# Patient Record
Sex: MALE | Race: WHITE | NOT HISPANIC OR LATINO | URBAN - METROPOLITAN AREA
[De-identification: names, ages, dates, MRNs, and addresses within clinical notes are randomized per-mention and may not be internally consistent; named-entity substitution may affect disease eponyms.]

---

## 2016-01-08 RX ORDER — ATORVASTATIN CALCIUM 80 MG/1
0.5 TABLET, FILM COATED ORAL
Qty: 0 | Refills: 0 | COMMUNITY
Start: 2016-01-08

## 2016-01-08 RX ORDER — ATORVASTATIN CALCIUM 80 MG/1
1 TABLET, FILM COATED ORAL
Qty: 0 | Refills: 0 | COMMUNITY
Start: 2016-01-08

## 2017-08-09 ENCOUNTER — INPATIENT (INPATIENT)
Facility: HOSPITAL | Age: 82
LOS: 7 days | Discharge: HOME | DRG: 226 | End: 2017-08-17
Attending: INTERNAL MEDICINE | Admitting: INTERNAL MEDICINE
Payer: MEDICARE

## 2017-08-09 VITALS
RESPIRATION RATE: 16 BRPM | SYSTOLIC BLOOD PRESSURE: 127 MMHG | OXYGEN SATURATION: 100 % | DIASTOLIC BLOOD PRESSURE: 77 MMHG | HEART RATE: 83 BPM | TEMPERATURE: 98 F

## 2017-08-09 DIAGNOSIS — Z95.1 PRESENCE OF AORTOCORONARY BYPASS GRAFT: Chronic | ICD-10-CM

## 2017-08-09 DIAGNOSIS — R07.9 CHEST PAIN, UNSPECIFIED: ICD-10-CM

## 2017-08-09 DIAGNOSIS — E11.8 TYPE 2 DIABETES MELLITUS WITH UNSPECIFIED COMPLICATIONS: ICD-10-CM

## 2017-08-09 DIAGNOSIS — Z29.9 ENCOUNTER FOR PROPHYLACTIC MEASURES, UNSPECIFIED: ICD-10-CM

## 2017-08-09 DIAGNOSIS — R06.02 SHORTNESS OF BREATH: ICD-10-CM

## 2017-08-09 DIAGNOSIS — N18.3 CHRONIC KIDNEY DISEASE, STAGE 3 (MODERATE): ICD-10-CM

## 2017-08-09 DIAGNOSIS — I50.9 HEART FAILURE, UNSPECIFIED: ICD-10-CM

## 2017-08-09 LAB
CK MB CFR SERPL CALC: 3.3 NG/ML — SIGNIFICANT CHANGE UP (ref 0–6.7)
CK SERPL-CCNC: 80 U/L — SIGNIFICANT CHANGE UP (ref 30–200)
TROPONIN T SERPL-MCNC: <0.01 NG/ML — SIGNIFICANT CHANGE UP (ref 0–0.06)

## 2017-08-09 PROCEDURE — 99285 EMERGENCY DEPT VISIT HI MDM: CPT

## 2017-08-09 PROCEDURE — 99223 1ST HOSP IP/OBS HIGH 75: CPT

## 2017-08-09 PROCEDURE — 71010: CPT | Mod: 26

## 2017-08-09 RX ORDER — DEXTROSE 50 % IN WATER 50 %
12.5 SYRINGE (ML) INTRAVENOUS ONCE
Qty: 0 | Refills: 0 | Status: DISCONTINUED | OUTPATIENT
Start: 2017-08-09 | End: 2017-08-17

## 2017-08-09 RX ORDER — SODIUM CHLORIDE 9 MG/ML
1000 INJECTION, SOLUTION INTRAVENOUS
Qty: 0 | Refills: 0 | Status: DISCONTINUED | OUTPATIENT
Start: 2017-08-09 | End: 2017-08-17

## 2017-08-09 RX ORDER — INSULIN LISPRO 100/ML
VIAL (ML) SUBCUTANEOUS
Qty: 0 | Refills: 0 | Status: DISCONTINUED | OUTPATIENT
Start: 2017-08-09 | End: 2017-08-17

## 2017-08-09 RX ORDER — HEPARIN SODIUM 5000 [USP'U]/ML
5000 INJECTION INTRAVENOUS; SUBCUTANEOUS EVERY 8 HOURS
Qty: 0 | Refills: 0 | Status: DISCONTINUED | OUTPATIENT
Start: 2017-08-09 | End: 2017-08-17

## 2017-08-09 RX ORDER — INSULIN GLARGINE 100 [IU]/ML
6 INJECTION, SOLUTION SUBCUTANEOUS AT BEDTIME
Qty: 0 | Refills: 0 | Status: DISCONTINUED | OUTPATIENT
Start: 2017-08-09 | End: 2017-08-17

## 2017-08-09 RX ORDER — ASPIRIN/CALCIUM CARB/MAGNESIUM 324 MG
81 TABLET ORAL DAILY
Qty: 0 | Refills: 0 | Status: DISCONTINUED | OUTPATIENT
Start: 2017-08-09 | End: 2017-08-17

## 2017-08-09 RX ORDER — INSULIN LISPRO 100/ML
VIAL (ML) SUBCUTANEOUS AT BEDTIME
Qty: 0 | Refills: 0 | Status: DISCONTINUED | OUTPATIENT
Start: 2017-08-09 | End: 2017-08-17

## 2017-08-09 RX ORDER — GABAPENTIN 400 MG/1
100 CAPSULE ORAL
Qty: 0 | Refills: 0 | Status: DISCONTINUED | OUTPATIENT
Start: 2017-08-09 | End: 2017-08-17

## 2017-08-09 RX ORDER — CLOPIDOGREL BISULFATE 75 MG/1
75 TABLET, FILM COATED ORAL AT BEDTIME
Qty: 0 | Refills: 0 | Status: DISCONTINUED | OUTPATIENT
Start: 2017-08-09 | End: 2017-08-17

## 2017-08-09 RX ORDER — GABAPENTIN 400 MG/1
200 CAPSULE ORAL AT BEDTIME
Qty: 0 | Refills: 0 | Status: DISCONTINUED | OUTPATIENT
Start: 2017-08-09 | End: 2017-08-17

## 2017-08-09 RX ORDER — DEXTROSE 50 % IN WATER 50 %
25 SYRINGE (ML) INTRAVENOUS ONCE
Qty: 0 | Refills: 0 | Status: DISCONTINUED | OUTPATIENT
Start: 2017-08-09 | End: 2017-08-17

## 2017-08-09 RX ORDER — GLUCAGON INJECTION, SOLUTION 0.5 MG/.1ML
1 INJECTION, SOLUTION SUBCUTANEOUS ONCE
Qty: 0 | Refills: 0 | Status: DISCONTINUED | OUTPATIENT
Start: 2017-08-09 | End: 2017-08-17

## 2017-08-09 RX ORDER — DEXTROSE 50 % IN WATER 50 %
1 SYRINGE (ML) INTRAVENOUS ONCE
Qty: 0 | Refills: 0 | Status: DISCONTINUED | OUTPATIENT
Start: 2017-08-09 | End: 2017-08-17

## 2017-08-09 RX ORDER — FUROSEMIDE 40 MG
40 TABLET ORAL ONCE
Qty: 0 | Refills: 0 | Status: COMPLETED | OUTPATIENT
Start: 2017-08-09 | End: 2017-08-09

## 2017-08-09 RX ORDER — CARVEDILOL PHOSPHATE 80 MG/1
6.25 CAPSULE, EXTENDED RELEASE ORAL EVERY 12 HOURS
Qty: 0 | Refills: 0 | Status: DISCONTINUED | OUTPATIENT
Start: 2017-08-09 | End: 2017-08-10

## 2017-08-09 RX ORDER — INSULIN GLARGINE 100 [IU]/ML
6 INJECTION, SOLUTION SUBCUTANEOUS EVERY MORNING
Qty: 0 | Refills: 0 | Status: DISCONTINUED | OUTPATIENT
Start: 2017-08-10 | End: 2017-08-17

## 2017-08-09 RX ADMIN — Medication 40 MILLIGRAM(S): at 23:30

## 2017-08-09 RX ADMIN — CARVEDILOL PHOSPHATE 6.25 MILLIGRAM(S): 80 CAPSULE, EXTENDED RELEASE ORAL at 23:30

## 2017-08-09 RX ADMIN — CLOPIDOGREL BISULFATE 75 MILLIGRAM(S): 75 TABLET, FILM COATED ORAL at 23:30

## 2017-08-09 RX ADMIN — HEPARIN SODIUM 5000 UNIT(S): 5000 INJECTION INTRAVENOUS; SUBCUTANEOUS at 23:30

## 2017-08-09 NOTE — H&P ADULT - PROBLEM SELECTOR PLAN 1
Given hx of CAD and CABG concern for ACS. Patient does not endorse pleurisy to pain.  Monitor on tele  Trend CE  Check TTE  Primary day team to place cardiology consult. Given hx of CAD and CABG concern for ACS. Patient does not endorse pleurisy to pain.  Reviewed chart under MRN [id number]: Last stress test (nuclear 2015) noted to be abnormal study.  Monitor on tele  Trend CE  Check TTE  Primary day team to place cardiology consult and eval method of ischemic eval. Given hx of CAD and CABG concern for ACS. Patient does not endorse pleurisy to pain.  Reviewed chart under MRN [id number]: Last stress test (nuclear 2015) noted to be abnormal study.  C/W home regimen of Aspirin, Plavix, Statin  Monitor on tele  Trend CE  Check TTE  Primary day team to place cardiology consult and eval method of ischemic eval.

## 2017-08-09 NOTE — H&P ADULT - RS GEN PE MLT RESP DETAILS PC
good air movement/respirations non-labored/clear to auscultation bilaterally/no rhonchi/airway patent/no rales/breath sounds equal

## 2017-08-09 NOTE — H&P ADULT - NSHPLABSRESULTS_GEN_ALL_CORE
Personally reviewed labs: H/H stable, INR 1.16 K 4.7 Cr 1.47 Glucose 182     Personally reviewed CXR: small L pleural effusion    Personally reviewed EKG: SR 92 bpm  QTc 484 LAD no appreciable ST segment changes. Personally reviewed labs: H/H stable, INR 1.16 K 4.7 Cr 1.47 Glucose 182     Personally reviewed CXR: small L pleural effusion    Personally reviewed EKG: SR 92 bpm  QTc 484 LAD no appreciable ST segment changes. Reviewed chart under MRN [id number]: comparable to 12/28/ 2015 EKG

## 2017-08-09 NOTE — H&P ADULT - ASSESSMENT
60 yo man with PMH of HTN, HLD, DMT2, CAD s/p CABG (~12 years ago) presenting with symptoms of CP and SOB.

## 2017-08-09 NOTE — H&P ADULT - PMH
CAD (coronary artery disease)    DM type 2 (diabetes mellitus, type 2)    Essential hypertension    HLD (hyperlipidemia)

## 2017-08-09 NOTE — H&P ADULT - NSHPOUTPATIENTPROVIDERS_GEN_ALL_CORE
Dr. Reynolds ([facility]) Dr. Negron ([facility]  Dr. CIRILO Santos (Cardiology)

## 2017-08-09 NOTE — H&P ADULT - ATTENDING COMMENTS
Dr. Garth Ibrahim accepted patient's case from the ED team and requested hospitalist team to complete admission. Patient previously unknown to me and I was assigned case. Dr. Ibrahim/[facility] team to assume care in AM. Case discussed with night NP. Dr. Garth Ibrahim accepted patient's case from the ED team and requested hospitalist team to complete admission. Patient previously unknown to me and I was assigned case. Dr. Ibrahim/[facility] team to assume care in AM. Case discussed with night NP, Jenny, [id number]

## 2017-08-09 NOTE — H&P ADULT - HISTORY OF PRESENT ILLNESS
60 yo man with PMH of HTN, HLD, DMT2, CAD s/p CABG (~12 years ago) presenting with symptoms of CP and SOB the past few days. Patient states that last symptoms were 3 am where he felt shortness of breath and chest tightness. Patient denies radiation of symptoms. Patient states he felt a bit clammy/sweaty, but denies associated palpitations, nausea, emesis. He also feels that he had put on more weight and thinks that his LE has worsening edema.  Patient states that symptoms persisted for a few hours. Patient did not take any medication at home.  Patients states he has had similar episodes when ambulating for long distances. Patient denies recent stress tests in the past. 60 yo man with PMH of HTN, HLD, DMT2, CAD s/p CABG (~12 years ago) presenting with symptoms of CP and SOB the past few days. Patient states that last symptoms were 3 am where he felt shortness of breath and chest tightness. Patient denies radiation of symptoms. Patient states he felt a bit clammy/sweaty, but denies associated palpitations, nausea, emesis. He also feels that he had put on more weight and thinks that his LE has worsening edema.  Patient states that symptoms persisted for a few hours. Patient did not take any medication at home or do anything for alleviation. Per patient typically sleeps with 2 pillow and lays on his side. He does not think he need to sit up at night to feel more comfortable.  Patients states he has had similar episodes of chest tightness and shortness of breath when ambulating for long distances. Patient denies recent stress tests in the past. Of note patient with other MRN: [id number]  60 yo man with PMH of HTN, HLD, DMT2, CAD s/p CABG (~12 years ago) presenting with symptoms of CP and SOB the past few days. Patient states that last symptoms were 3 am where he felt shortness of breath and chest tightness. Patient denies radiation of symptoms. Patient states he felt a bit clammy/sweaty, but denies associated palpitations, nausea, emesis. He also feels that he had put on more weight and thinks that his LE has worsening edema.  Patient states that symptoms persisted for a few hours. Patient did not take any medication at home or do anything for alleviation. Per patient typically sleeps with 2 pillow and lays on his side. He does not think he need to sit up at night to feel more comfortable.  Patients states he has had similar episodes of chest tightness and shortness of breath when ambulating for long distances. Patient denies recent stress tests in the past. Per daughter, Jena, patient has been on Lasix in the past but has been discontinued.

## 2017-08-09 NOTE — H&P ADULT - PROBLEM SELECTOR PLAN 3
Hold oral medications  Continue with home dose of Lantus  Continue with SSI  Monitor FS  Check HgbA1c  Continue home dose of gabapentin for Neuropathy

## 2017-08-09 NOTE — H&P ADULT - NEUROLOGICAL DETAILS
sensation intact/responds to pain/responds to verbal commands/alert and oriented x 3/deep reflexes intact/cranial nerves intact

## 2017-08-09 NOTE — H&P ADULT - PROBLEM SELECTOR PLAN 2
Concern for decompensated heart failure as patient with increased LE edema, signs of pleural effusion on left and elevated BNP 5796 (prior BNP in 400s-500s). No hypoxia on exam. No persistent tachycardia on tele (SR 80s in ED).  Will try trial of IV lasix overnight  Primary day team to reeval diuresis regimen in AM  Monitor daily weights  Strict I/O  Check LE doppler for DVT Reviewed chart under MRN [id number]: Last EF TTE 2015 EF 37%  Concern for acute on chronic systolic heart failure as patient with increased LE edema, signs of pleural effusion on left and elevated BNP 5796 (prior BNP in 400s-500s). No hypoxia on exam. No persistent tachycardia on tele (SR 80s in ED).  Will try trial of IV lasix overnight  Primary day team to reeval diuresis regimen in AM  Monitor daily weights  Strict I/O  Check LE doppler for DVT

## 2017-08-10 DIAGNOSIS — I25.10 ATHEROSCLEROTIC HEART DISEASE OF NATIVE CORONARY ARTERY WITHOUT ANGINA PECTORIS: ICD-10-CM

## 2017-08-10 DIAGNOSIS — I50.23 ACUTE ON CHRONIC SYSTOLIC (CONGESTIVE) HEART FAILURE: ICD-10-CM

## 2017-08-10 DIAGNOSIS — I20.8 OTHER FORMS OF ANGINA PECTORIS: ICD-10-CM

## 2017-08-10 LAB
ANION GAP SERPL CALC-SCNC: 12 MMOL/L — SIGNIFICANT CHANGE UP (ref 5–17)
BUN SERPL-MCNC: 23 MG/DL — SIGNIFICANT CHANGE UP (ref 7–23)
CALCIUM SERPL-MCNC: 9.1 MG/DL — SIGNIFICANT CHANGE UP (ref 8.4–10.5)
CHLORIDE SERPL-SCNC: 105 MMOL/L — SIGNIFICANT CHANGE UP (ref 96–108)
CK MB BLD-MCNC: 3.5 % — SIGNIFICANT CHANGE UP (ref 0–3.5)
CK MB BLD-MCNC: 4.1 % — HIGH (ref 0–3.5)
CK MB CFR SERPL CALC: 2.7 NG/ML — SIGNIFICANT CHANGE UP (ref 0–6.7)
CK MB CFR SERPL CALC: 2.9 NG/ML — SIGNIFICANT CHANGE UP (ref 0–6.7)
CK MB CFR SERPL CALC: 3.1 NG/ML — SIGNIFICANT CHANGE UP (ref 0–6.7)
CK SERPL-CCNC: 71 U/L — SIGNIFICANT CHANGE UP (ref 30–200)
CK SERPL-CCNC: 78 U/L — SIGNIFICANT CHANGE UP (ref 30–200)
CK SERPL-CCNC: 86 U/L — SIGNIFICANT CHANGE UP (ref 30–200)
CO2 SERPL-SCNC: 24 MMOL/L — SIGNIFICANT CHANGE UP (ref 22–31)
CREAT SERPL-MCNC: 1.44 MG/DL — HIGH (ref 0.5–1.3)
GLUCOSE SERPL-MCNC: 128 MG/DL — HIGH (ref 70–99)
HBA1C BLD-MCNC: 7.9 % — HIGH (ref 4–5.6)
HCT VFR BLD CALC: 39.2 % — SIGNIFICANT CHANGE UP (ref 39–50)
HGB BLD-MCNC: 12.7 G/DL — LOW (ref 13–17)
MAGNESIUM SERPL-MCNC: 2 MG/DL — SIGNIFICANT CHANGE UP (ref 1.6–2.6)
MCHC RBC-ENTMCNC: 29.8 PG — SIGNIFICANT CHANGE UP (ref 27–34)
MCHC RBC-ENTMCNC: 32.5 GM/DL — SIGNIFICANT CHANGE UP (ref 32–36)
MCV RBC AUTO: 91.5 FL — SIGNIFICANT CHANGE UP (ref 80–100)
PHOSPHATE SERPL-MCNC: 2.8 MG/DL — SIGNIFICANT CHANGE UP (ref 2.5–4.5)
PLATELET # BLD AUTO: 219 K/UL — SIGNIFICANT CHANGE UP (ref 150–400)
POTASSIUM SERPL-MCNC: 4.8 MMOL/L — SIGNIFICANT CHANGE UP (ref 3.5–5.3)
POTASSIUM SERPL-SCNC: 4.8 MMOL/L — SIGNIFICANT CHANGE UP (ref 3.5–5.3)
RBC # BLD: 4.28 M/UL — SIGNIFICANT CHANGE UP (ref 4.2–5.8)
RBC # FLD: 14.6 % — HIGH (ref 10.3–14.5)
SODIUM SERPL-SCNC: 141 MMOL/L — SIGNIFICANT CHANGE UP (ref 135–145)
TROPONIN T SERPL-MCNC: <0.01 NG/ML — SIGNIFICANT CHANGE UP (ref 0–0.06)
WBC # BLD: 7.3 K/UL — SIGNIFICANT CHANGE UP (ref 3.8–10.5)
WBC # FLD AUTO: 7.3 K/UL — SIGNIFICANT CHANGE UP (ref 3.8–10.5)

## 2017-08-10 PROCEDURE — 93306 TTE W/DOPPLER COMPLETE: CPT | Mod: 26

## 2017-08-10 PROCEDURE — 93010 ELECTROCARDIOGRAM REPORT: CPT | Mod: 76

## 2017-08-10 RX ORDER — FUROSEMIDE 40 MG
20 TABLET ORAL DAILY
Qty: 0 | Refills: 0 | Status: DISCONTINUED | OUTPATIENT
Start: 2017-08-10 | End: 2017-08-14

## 2017-08-10 RX ORDER — ISOSORBIDE MONONITRATE 60 MG/1
30 TABLET, EXTENDED RELEASE ORAL DAILY
Qty: 0 | Refills: 0 | Status: DISCONTINUED | OUTPATIENT
Start: 2017-08-10 | End: 2017-08-11

## 2017-08-10 RX ADMIN — GABAPENTIN 200 MILLIGRAM(S): 400 CAPSULE ORAL at 22:39

## 2017-08-10 RX ADMIN — CARVEDILOL PHOSPHATE 6.25 MILLIGRAM(S): 80 CAPSULE, EXTENDED RELEASE ORAL at 09:12

## 2017-08-10 RX ADMIN — CARVEDILOL PHOSPHATE 6.25 MILLIGRAM(S): 80 CAPSULE, EXTENDED RELEASE ORAL at 18:08

## 2017-08-10 RX ADMIN — INSULIN GLARGINE 6 UNIT(S): 100 INJECTION, SOLUTION SUBCUTANEOUS at 22:39

## 2017-08-10 RX ADMIN — INSULIN GLARGINE 6 UNIT(S): 100 INJECTION, SOLUTION SUBCUTANEOUS at 01:32

## 2017-08-10 RX ADMIN — GABAPENTIN 100 MILLIGRAM(S): 400 CAPSULE ORAL at 15:35

## 2017-08-10 RX ADMIN — Medication 20 MILLIGRAM(S): at 12:15

## 2017-08-10 RX ADMIN — HEPARIN SODIUM 5000 UNIT(S): 5000 INJECTION INTRAVENOUS; SUBCUTANEOUS at 15:35

## 2017-08-10 RX ADMIN — HEPARIN SODIUM 5000 UNIT(S): 5000 INJECTION INTRAVENOUS; SUBCUTANEOUS at 05:59

## 2017-08-10 RX ADMIN — CLOPIDOGREL BISULFATE 75 MILLIGRAM(S): 75 TABLET, FILM COATED ORAL at 22:39

## 2017-08-10 RX ADMIN — Medication 81 MILLIGRAM(S): at 12:14

## 2017-08-10 RX ADMIN — INSULIN GLARGINE 6 UNIT(S): 100 INJECTION, SOLUTION SUBCUTANEOUS at 09:12

## 2017-08-10 RX ADMIN — GABAPENTIN 100 MILLIGRAM(S): 400 CAPSULE ORAL at 09:12

## 2017-08-10 RX ADMIN — ISOSORBIDE MONONITRATE 30 MILLIGRAM(S): 60 TABLET, EXTENDED RELEASE ORAL at 12:15

## 2017-08-10 RX ADMIN — HEPARIN SODIUM 5000 UNIT(S): 5000 INJECTION INTRAVENOUS; SUBCUTANEOUS at 22:39

## 2017-08-10 NOTE — CONSULT NOTE ADULT - PROBLEM SELECTOR RECOMMENDATION 2
-  - troponins have been negative.  - outpatient nuclear stress in april showed evidence of distal inferolateral and inferoapical injury without signficant ischemia.   - No plan for cardiac cath at this time.  - cont home dose of coreg.  - will add imdur 30 mg po daily.

## 2017-08-10 NOTE — CHART NOTE - NSCHARTNOTEFT_GEN_A_CORE
Notified of pause of 3.78 sec , seen and examined, asymptomatic  denied any CP , sob , Di, Noted a drop in BP , d/w DR Santos , recommends  Ep consult to [facility] and D/C Coreg,, c/w Tele ,  place R2 pads on Pt . Discussed the case with cardiology fellow DR Emeka Hernandez , who will evaluate the Pt , Pt signed out to night covering team     Arely Garibay  NP-C [id number] Notified of pause of 3.78 sec , seen and examined, asymptomatic  denied any CP , sob , Dizziness , palpitations, nausea ,vomiting, diaphoresis  or abdominal pain .  Noted a drop in BP ,  12 lead EKG with new changes in  Lead 2, and  ST elevation in  v3- v6 d/w DR Santos , recommends  Ep consult to House and D/C Coreg,, c/w Tele ,  CE  x1 ,  place R2 pads on Pt . Discussed the case with cardiology fellow DR Emeka Hernandez , who will evaluate the Pt , Pt signed out to night covering team     Arely Garibay  NP-C [id number]

## 2017-08-10 NOTE — PROGRESS NOTE ADULT - SUBJECTIVE AND OBJECTIVE BOX
Patient is a 89y old  Male who presents with a chief complaint of chest pain SOB (10 Aug 2017 01:47)      SUBJECTIVE / OVERNIGHT EVENTS:    pt with daughter and wife bedside.  no states sob continues not frequent trips to urinate no cp abd pain n/v/d     Vital Signs Last 24 Hrs  T(C): 36.4 (10 Aug 2017 12:15), Max: 36.6 (09 Aug 2017 20:00)  T(F): 15 (10 Aug 2017 12:30), Max: 97.8 (09 Aug 2017 20:00)  HR: 85 (10 Aug 2017 12:15) (79 - 92)  BP: 149/85 (10 Aug 2017 12:15) (119/72 - 166/88)  BP(mean): --  RR: 18 (10 Aug 2017 12:15) (16 - 18)  SpO2: 96% (10 Aug 2017 12:15) (96% - 100%)  I&O's Summary    09 Aug 2017 07:01  -  10 Aug 2017 07:00  --------------------------------------------------------  IN: 240 mL / OUT: 700 mL / NET: -460 mL    10 Aug 2017 07:01  -  10 Aug 2017 13:37  --------------------------------------------------------  IN: 300 mL / OUT: 0 mL / NET: 300 mL            LABS:                        12.7   7.3   )-----------( 219      ( 10 Aug 2017 06:46 )             39.2     08-10    141  |  105  |  23  ----------------------------<  128<H>  4.8   |  24  |  1.44<H>    Ca    9.1      10 Aug 2017 06:49  Phos  2.8     08-10  Mg     2.0     08-10    TPro  6.6  /  Alb  3.5  /  TBili  0.4  /  DBili  x   /  AST  15  /  ALT  13  /  AlkPhos  109  08-09    PT/INR - ( 09 Aug 2017 14:25 )   PT: 12.7 sec;   INR: 1.16 ratio         PTT - ( 09 Aug 2017 14:25 )  PTT:30.4 sec  CAPILLARY BLOOD GLUCOSE  120 (10 Aug 2017 12:55)  122 (10 Aug 2017 08:39)  135 (10 Aug 2017 01:27)        CARDIAC MARKERS ( 10 Aug 2017 06:49 )  x     / <0.01 ng/mL / 86 U/L / x     / 3.1 ng/mL  CARDIAC MARKERS ( 10 Aug 2017 00:02 )  x     / <0.01 ng/mL / 71 U/L / x     / 2.9 ng/mL  CARDIAC MARKERS ( 09 Aug 2017 17:44 )  x     / <0.01 ng/mL / 80 U/L / x     / 3.3 ng/mL  CARDIAC MARKERS ( 09 Aug 2017 14:25 )  x     / <0.01 ng/mL / x     / x     / x              RADIOLOGY & ADDITIONAL TESTS:    Imaging Personally Reviewed:  [x] YES  [ ] NO    Consultant(s) Notes Reviewed:  [x] YES  [ ] NO      MEDICATIONS  (STANDING):  heparin  Injectable 5000 Unit(s) SubCutaneous every 8 hours  aspirin enteric coated 81 milliGRAM(s) Oral daily  clopidogrel Tablet 75 milliGRAM(s) Oral at bedtime  gabapentin 200 milliGRAM(s) Oral at bedtime  gabapentin 100 milliGRAM(s) Oral <User Schedule>  carvedilol 6.25 milliGRAM(s) Oral every 12 hours  insulin glargine Injectable (LANTUS) 6 Unit(s) SubCutaneous every morning  insulin glargine Injectable (LANTUS) 6 Unit(s) SubCutaneous at bedtime  insulin lispro (HumaLOG) corrective regimen sliding scale   SubCutaneous three times a day before meals  insulin lispro (HumaLOG) corrective regimen sliding scale   SubCutaneous at bedtime  dextrose 5%. 1000 milliLiter(s) (50 mL/Hr) IV Continuous <Continuous>  dextrose 50% Injectable 12.5 Gram(s) IV Push once  dextrose 50% Injectable 25 Gram(s) IV Push once  dextrose 50% Injectable 25 Gram(s) IV Push once  furosemide   Injectable 20 milliGRAM(s) IV Push daily  isosorbide   mononitrate ER Tablet (IMDUR) 30 milliGRAM(s) Oral daily    MEDICATIONS  (PRN):  dextrose Gel 1 Dose(s) Oral once PRN Blood Glucose LESS THAN 70 milliGRAM(s)/deciliter  glucagon  Injectable 1 milliGRAM(s) IntraMuscular once PRN Glucose LESS THAN 70 milligrams/deciliter      Care Discussed with Consultants/Other Providers [x] YES  [ ] NO    HEALTH ISSUES - PROBLEM Dx:  Acute on chronic systolic (congestive) heart failure: Acute on chronic systolic (congestive) heart failure  Chronic stable angina: Chronic stable angina  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

## 2017-08-10 NOTE — CONSULT NOTE ADULT - ASSESSMENT
89 M h/o CAD s/p CABG in 2005 and PCI in 2009, ischemic cardiomyopathy (EF 29% on nuclear stress test 4/11/17), DM type II, HTN, carotid stenosis s/p R CEA a/w CP and acute on chronic systolic HF.

## 2017-08-10 NOTE — CONSULT NOTE ADULT - SUBJECTIVE AND OBJECTIVE BOX
[facility] Cardiology Consult  _________________________    CC: CP and dyspnea.       HPI:  89 M h/o CAD s/p CABG in 2005 and PCI in 2009, ischemic cardiomyopathy (EF 29% on nuclear stress test 4/11/17), DM type II, HTN, carotid stenosis s/p R CEA who presented to the hospital with worsening dyspnea and CP. He last saw me in the office in April and he had refused to increase his diuretics at that time. His symptoms started at 3 am - he felt shortness of breath and chest tightness with associated diaphoresis, but no palpitations, nausea, vomiting. He also reports recent increase in his weight and LE edema.      PAST MEDICAL & SURGICAL HISTORY:  CAD (coronary artery disease)  DM type 2 (diabetes mellitus, type 2)  HLD (hyperlipidemia)  Essential hypertension  S/P CABG (coronary artery bypass graft)    MEDICATIONS (HOME):  coreg 6.25 BID  Lipitor 40  plavix 75  ASA 81  Prandin  Onglyza  Primidone  Neurontin  Vesicar  gabapentin  NTG SL    MEDICATIONS  (STANDING):  heparin  Injectable 5000 Unit(s) SubCutaneous every 8 hours  aspirin enteric coated 81 milliGRAM(s) Oral daily  clopidogrel Tablet 75 milliGRAM(s) Oral at bedtime  gabapentin 200 milliGRAM(s) Oral at bedtime  gabapentin 100 milliGRAM(s) Oral <User Schedule>  carvedilol 6.25 milliGRAM(s) Oral every 12 hours  insulin glargine Injectable (LANTUS) 6 Unit(s) SubCutaneous every morning  insulin glargine Injectable (LANTUS) 6 Unit(s) SubCutaneous at bedtime  insulin lispro (HumaLOG) corrective regimen sliding scale   SubCutaneous three times a day before meals  insulin lispro (HumaLOG) corrective regimen sliding scale   SubCutaneous at bedtime  dextrose 5%. 1000 milliLiter(s) (50 mL/Hr) IV Continuous <Continuous>  dextrose 50% Injectable 12.5 Gram(s) IV Push once  dextrose 50% Injectable 25 Gram(s) IV Push once  dextrose 50% Injectable 25 Gram(s) IV Push once    MEDICATIONS  (PRN):  dextrose Gel 1 Dose(s) Oral once PRN Blood Glucose LESS THAN 70 milliGRAM(s)/deciliter  glucagon  Injectable 1 milliGRAM(s) IntraMuscular once PRN Glucose LESS THAN 70 milligrams/deciliter      Allergies    No Known Allergies    Intolerances        Social Histroy: Tobacco- , ETOH-, Illicit Drugs-    T(C): 36.5 (08-10-17 @ 05:43), Max: 36.6 (08-09-17 @ 20:00)  HR: 79 (08-10-17 @ 05:43) (79 - 92)  BP: 119/72 (08-10-17 @ 05:43) (119/72 - 166/88)  RR: 18 (08-10-17 @ 05:43) (16 - 18)  SpO2: 97% (08-10-17 @ 05:43) (97% - 100%)  I&O's Summary    09 Aug 2017 07:01  -  10 Aug 2017 07:00  --------------------------------------------------------  IN: 240 mL / OUT: 700 mL / NET: -460 mL        Review of Systems:  Constitutional: [ ] Fever [ ] Chills [ ] Fatigue [ ] Weight change   HEENT: [ ] Blurred vision [ ] Eye Pain [ ] Headache [ ] Runny nose [ ] Sore Throat   Respiratory: [ ] Cough [ ] Wheezing [ ] Shortness of breath  Cardiovascular: [x ] Chest Pain [ ] Palpitations [x ] BARCENAS [ ] PND [ ] Orthopnea [x] LE edema  Gastrointestinal: [ ] Abdominal Pain [ ] Diarrhea [ ] Constipation [ ] Hemorrhoids [ ] Nausea [ ] Vomiting  Genitourinary: [ ] Nocturia [ ] Dysuria [ ] Incontinence  Extremities: [ ] Swelling [ ] Joint Pain  Neurologic: [ ] Focal deficit [ ] Paresthesias [ ] Syncope  Lymphatic: [ ] Swelling [ ] Lymphadenopathy   Skin: [ ] Rash [ ] Ecchymoses [ ] Wounds [ ] Lesions  Psychiatry: [ ] Depression [ ] Suicidal/Homicidal Ideation [ ] Anxiety [ ] Sleep Disturbances  [x ] 10 point review of systems is otherwise negative except as mentioned above            [ ]Unable to obtain    PHYSICAL EXAM:  GENERAL: Alert, NAD  NECK: Supple, No JVD, No carotid bruit.  CHEST/LUNG: Clear to auscultation bilaterally; No wheezes, rales, or rhonchi  HEART: S1 S2 normal, RRR,  No murmurs, rubs, or gallops  ABDOMEN: Soft, Nontender, Nondistended; Bowel sounds present  EXTREMITIES: 2+ Edema.      LABS:                        12.7   7.3   )-----------( 219      ( 10 Aug 2017 06:46 )             39.2     08-10    141  |  105  |  23  ----------------------------<  128<H>  4.8   |  24  |  1.44<H>    Ca    9.1      10 Aug 2017 06:49  Phos  2.8     08-10  Mg     2.0     08-10    TPro  6.6  /  Alb  3.5  /  TBili  0.4  /  DBili  x   /  AST  15  /  ALT  13  /  AlkPhos  109  08-09    PT/INR - ( 09 Aug 2017 14:25 )   PT: 12.7 sec;   INR: 1.16 ratio         PTT - ( 09 Aug 2017 14:25 )  PTT:30.4 sec  CARDIAC MARKERS ( 10 Aug 2017 06:49 )  x     / <0.01 ng/mL / 86 U/L / x     / 3.1 ng/mL  CARDIAC MARKERS ( 10 Aug 2017 00:02 )  x     / <0.01 ng/mL / 71 U/L / x     / 2.9 ng/mL  CARDIAC MARKERS ( 09 Aug 2017 17:44 )  x     / <0.01 ng/mL / 80 U/L / x     / 3.3 ng/mL  CARDIAC MARKERS ( 09 Aug 2017 14:25 )  x     / <0.01 ng/mL / x     / x     / x          Serum Pro-Brain Natriuretic Peptide: 5796 pg/mL (08-09-17 @ 14:25)          MEDICATIONS  (STANDING):  heparin  Injectable 5000 Unit(s) SubCutaneous every 8 hours  aspirin enteric coated 81 milliGRAM(s) Oral daily  clopidogrel Tablet 75 milliGRAM(s) Oral at bedtime  gabapentin 200 milliGRAM(s) Oral at bedtime  gabapentin 100 milliGRAM(s) Oral <User Schedule>  carvedilol 6.25 milliGRAM(s) Oral every 12 hours  insulin glargine Injectable (LANTUS) 6 Unit(s) SubCutaneous every morning  insulin glargine Injectable (LANTUS) 6 Unit(s) SubCutaneous at bedtime  insulin lispro (HumaLOG) corrective regimen sliding scale   SubCutaneous three times a day before meals  insulin lispro (HumaLOG) corrective regimen sliding scale   SubCutaneous at bedtime  dextrose 5%. 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 [facility] Cardiology Consult  _________________________    CC: CP and dyspnea.       HPI:  89 M h/o CAD s/p CABG in 2005 and PCI in 2009, ischemic cardiomyopathy (EF 29% on nuclear stress test 4/11/17), DM type II, HTN, carotid stenosis s/p R CEA who presented to the hospital with worsening dyspnea and CP. He last saw me in the office in April and he had refused to increase his diuretics at that time. His symptoms started at 3 am - he felt shortness of breath and chest tightness with associated diaphoresis, but no palpitations, nausea, vomiting. He also reports recent increase in his weight and LE edema.      PAST MEDICAL & SURGICAL HISTORY:  CAD (coronary artery disease)  DM type 2 (diabetes mellitus, type 2)  HLD (hyperlipidemia)  Essential hypertension  S/P CABG (coronary artery bypass graft)    MEDICATIONS (HOME):  coreg 6.25 BID  Lipitor 40  plavix 75  ASA 81  Prandin  Onglyza  Primidone  Neurontin  Vesicar  gabapentin  NTG SL    MEDICATIONS  (STANDING):  heparin  Injectable 5000 Unit(s) SubCutaneous every 8 hours  aspirin enteric coated 81 milliGRAM(s) Oral daily  clopidogrel Tablet 75 milliGRAM(s) Oral at bedtime  gabapentin 200 milliGRAM(s) Oral at bedtime  gabapentin 100 milliGRAM(s) Oral <User Schedule>  carvedilol 6.25 milliGRAM(s) Oral every 12 hours  insulin glargine Injectable (LANTUS) 6 Unit(s) SubCutaneous every morning  insulin glargine Injectable (LANTUS) 6 Unit(s) SubCutaneous at bedtime  insulin lispro (HumaLOG) corrective regimen sliding scale   SubCutaneous three times a day before meals  insulin lispro (HumaLOG) corrective regimen sliding scale   SubCutaneous at bedtime  dextrose 5%. 1000 milliLiter(s) (50 mL/Hr) IV Continuous <Continuous>  dextrose 50% Injectable 12.5 Gram(s) IV Push once  dextrose 50% Injectable 25 Gram(s) IV Push once  dextrose 50% Injectable 25 Gram(s) IV Push once    MEDICATIONS  (PRN):  dextrose Gel 1 Dose(s) Oral once PRN Blood Glucose LESS THAN 70 milliGRAM(s)/deciliter  glucagon  Injectable 1 milliGRAM(s) IntraMuscular once PRN Glucose LESS THAN 70 milligrams/deciliter      Allergies    No Known Allergies    Intolerances        Social Histroy: Tobacco- , ETOH-, Illicit Drugs-    T(C): 36.5 (08-10-17 @ 05:43), Max: 36.6 (08-09-17 @ 20:00)  HR: 79 (08-10-17 @ 05:43) (79 - 92)  BP: 119/72 (08-10-17 @ 05:43) (119/72 - 166/88)  RR: 18 (08-10-17 @ 05:43) (16 - 18)  SpO2: 97% (08-10-17 @ 05:43) (97% - 100%)  I&O's Summary    09 Aug 2017 07:01  -  10 Aug 2017 07:00  --------------------------------------------------------  IN: 240 mL / OUT: 700 mL / NET: -460 mL        Review of Systems:  Constitutional: [ ] Fever [ ] Chills [ ] Fatigue [ ] Weight change   HEENT: [ ] Blurred vision [ ] Eye Pain [ ] Headache [ ] Runny nose [ ] Sore Throat   Respiratory: [ ] Cough [ ] Wheezing [ ] Shortness of breath  Cardiovascular: [x ] Chest Pain [ ] Palpitations [x ] BARCENAS [ ] PND [ ] Orthopnea [x] LE edema  Gastrointestinal: [ ] Abdominal Pain [ ] Diarrhea [ ] Constipation [ ] Hemorrhoids [ ] Nausea [ ] Vomiting  Genitourinary: [ ] Nocturia [ ] Dysuria [ ] Incontinence  Extremities: [ ] Swelling [ ] Joint Pain  Neurologic: [ ] Focal deficit [ ] Paresthesias [ ] Syncope  Lymphatic: [ ] Swelling [ ] Lymphadenopathy   Skin: [ ] Rash [ ] Ecchymoses [ ] Wounds [ ] Lesions  Psychiatry: [ ] Depression [ ] Suicidal/Homicidal Ideation [ ] Anxiety [ ] Sleep Disturbances  [x ] 10 point review of systems is otherwise negative except as mentioned above            [ ]Unable to obtain    PHYSICAL EXAM:  GENERAL: Alert, NAD  NECK: Supple, No JVD, No carotid bruit.  CHEST/LUNG: Mild basilar crackles  HEART: S1 S2 normal, RRR,  No murmurs, rubs, or gallops  ABDOMEN: Soft, Nontender, Nondistended; Bowel sounds present  EXTREMITIES: 2+ Edema.      LABS:                        12.7   7.3   )-----------( 219      ( 10 Aug 2017 06:46 )             39.2     08-10    141  |  105  |  23  ----------------------------<  128<H>  4.8   |  24  |  1.44<H>    Ca    9.1      10 Aug 2017 06:49  Phos  2.8     08-10  Mg     2.0     08-10    TPro  6.6  /  Alb  3.5  /  TBili  0.4  /  DBili  x   /  AST  15  /  ALT  13  /  AlkPhos  109  08-09    PT/INR - ( 09 Aug 2017 14:25 )   PT: 12.7 sec;   INR: 1.16 ratio         PTT - ( 09 Aug 2017 14:25 )  PTT:30.4 sec  CARDIAC MARKERS ( 10 Aug 2017 06:49 )  x     / <0.01 ng/mL / 86 U/L / x     / 3.1 ng/mL  CARDIAC MARKERS ( 10 Aug 2017 00:02 )  x     / <0.01 ng/mL / 71 U/L / x     / 2.9 ng/mL  CARDIAC MARKERS ( 09 Aug 2017 17:44 )  x     / <0.01 ng/mL / 80 U/L / x     / 3.3 ng/mL  CARDIAC MARKERS ( 09 Aug 2017 14:25 )  x     / <0.01 ng/mL / x     / x     / x          Serum Pro-Brain Natriuretic Peptide: 5796 pg/mL (08-09-17 @ 14:25)          MEDICATIONS  (STANDING):  heparin  Injectable 5000 Unit(s) SubCutaneous every 8 hours  aspirin enteric coated 81 milliGRAM(s) Oral daily  clopidogrel Tablet 75 milliGRAM(s) Oral at bedtime  gabapentin 200 milliGRAM(s) Oral at bedtime  gabapentin 100 milliGRAM(s) Oral <User Schedule>  carvedilol 6.25 milliGRAM(s) Oral every 12 hours  insulin glargine Injectable (LANTUS) 6 Unit(s) SubCutaneous every morning  insulin glargine Injectable (LANTUS) 6 Unit(s) SubCutaneous at bedtime  insulin lispro (HumaLOG) corrective regimen sliding scale   SubCutaneous three times a day before meals  insulin lispro (HumaLOG) corrective regimen sliding scale   SubCutaneous at bedtime  dextrose 5%. 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

## 2017-08-10 NOTE — CONSULT NOTE ADULT - PROBLEM SELECTOR RECOMMENDATION 3
-  - Echo today preliminarily shows severely reduced LV systolic function, which is similar to the EF on outpatient nuclear stress test in April.  - follow-up official report.  - Lasix 20 mg IVP daily.  - monitor I/O and daily weights.   -   will follow.    A total of 80 minutes of face-to-face time was spent with the patient during this encounter -over half of that time was spent in counseling and coordination of care.    Jermaine Santos M.D., [facility]  [phone]

## 2017-08-11 DIAGNOSIS — I44.39 OTHER ATRIOVENTRICULAR BLOCK: ICD-10-CM

## 2017-08-11 LAB
ANION GAP SERPL CALC-SCNC: 10 MMOL/L — SIGNIFICANT CHANGE UP (ref 5–17)
BUN SERPL-MCNC: 27 MG/DL — HIGH (ref 7–23)
CALCIUM SERPL-MCNC: 9 MG/DL — SIGNIFICANT CHANGE UP (ref 8.4–10.5)
CHLORIDE SERPL-SCNC: 105 MMOL/L — SIGNIFICANT CHANGE UP (ref 96–108)
CO2 SERPL-SCNC: 26 MMOL/L — SIGNIFICANT CHANGE UP (ref 22–31)
CREAT SERPL-MCNC: 1.63 MG/DL — HIGH (ref 0.5–1.3)
GLUCOSE SERPL-MCNC: 90 MG/DL — SIGNIFICANT CHANGE UP (ref 70–99)
MAGNESIUM SERPL-MCNC: 2.1 MG/DL — SIGNIFICANT CHANGE UP (ref 1.6–2.6)
POTASSIUM SERPL-MCNC: 4.6 MMOL/L — SIGNIFICANT CHANGE UP (ref 3.5–5.3)
POTASSIUM SERPL-SCNC: 4.6 MMOL/L — SIGNIFICANT CHANGE UP (ref 3.5–5.3)
SODIUM SERPL-SCNC: 141 MMOL/L — SIGNIFICANT CHANGE UP (ref 135–145)

## 2017-08-11 PROCEDURE — 99223 1ST HOSP IP/OBS HIGH 75: CPT | Mod: GC

## 2017-08-11 RX ORDER — ISOSORBIDE MONONITRATE 60 MG/1
60 TABLET, EXTENDED RELEASE ORAL DAILY
Qty: 0 | Refills: 0 | Status: DISCONTINUED | OUTPATIENT
Start: 2017-08-11 | End: 2017-08-17

## 2017-08-11 RX ADMIN — Medication 81 MILLIGRAM(S): at 09:42

## 2017-08-11 RX ADMIN — HEPARIN SODIUM 5000 UNIT(S): 5000 INJECTION INTRAVENOUS; SUBCUTANEOUS at 06:05

## 2017-08-11 RX ADMIN — ISOSORBIDE MONONITRATE 60 MILLIGRAM(S): 60 TABLET, EXTENDED RELEASE ORAL at 13:42

## 2017-08-11 RX ADMIN — INSULIN GLARGINE 6 UNIT(S): 100 INJECTION, SOLUTION SUBCUTANEOUS at 09:41

## 2017-08-11 RX ADMIN — Medication 20 MILLIGRAM(S): at 06:05

## 2017-08-11 RX ADMIN — CLOPIDOGREL BISULFATE 75 MILLIGRAM(S): 75 TABLET, FILM COATED ORAL at 21:55

## 2017-08-11 RX ADMIN — HEPARIN SODIUM 5000 UNIT(S): 5000 INJECTION INTRAVENOUS; SUBCUTANEOUS at 13:42

## 2017-08-11 RX ADMIN — HEPARIN SODIUM 5000 UNIT(S): 5000 INJECTION INTRAVENOUS; SUBCUTANEOUS at 21:55

## 2017-08-11 RX ADMIN — INSULIN GLARGINE 6 UNIT(S): 100 INJECTION, SOLUTION SUBCUTANEOUS at 21:56

## 2017-08-11 RX ADMIN — GABAPENTIN 100 MILLIGRAM(S): 400 CAPSULE ORAL at 09:41

## 2017-08-11 RX ADMIN — GABAPENTIN 200 MILLIGRAM(S): 400 CAPSULE ORAL at 21:55

## 2017-08-11 RX ADMIN — GABAPENTIN 100 MILLIGRAM(S): 400 CAPSULE ORAL at 13:42

## 2017-08-11 NOTE — PROGRESS NOTE ADULT - SUBJECTIVE AND OBJECTIVE BOX
Patient is a 89y old  Male who presents with a chief complaint of chest pain SOB (10 Aug 2017 01:47)      SUBJECTIVE / OVERNIGHT EVENTS:    pt resting in bed state he feels better today sob improved no n/v/d fever chills     Vital Signs Last 24 Hrs  T(C): 36.5 (11 Aug 2017 12:39), Max: 37 (10 Aug 2017 21:02)  T(F): 97.7 (11 Aug 2017 12:39), Max: 98.6 (10 Aug 2017 21:02)  HR: 83 (11 Aug 2017 12:39) (69 - 83)  BP: 118/74 (11 Aug 2017 12:39) (109/66 - 132/77)  BP(mean): --  RR: 18 (11 Aug 2017 12:39) (18 - 18)  SpO2: 99% (11 Aug 2017 12:39) (94% - 99%)  I&O's Summary    10 Aug 2017 07:01  -  11 Aug 2017 07:00  --------------------------------------------------------  IN: 720 mL / OUT: 200 mL / NET: 520 mL            LABS:                        12.7   7.3   )-----------( 219      ( 10 Aug 2017 06:46 )             39.2     08-11    141  |  105  |  27<H>  ----------------------------<  90  4.6   |  26  |  1.63<H>    Ca    9.0      11 Aug 2017 07:21  Phos  2.8     08-10  Mg     2.1     08-11    TPro  6.6  /  Alb  3.5  /  TBili  0.4  /  DBili  x   /  AST  15  /  ALT  13  /  AlkPhos  109  08-09    PT/INR - ( 09 Aug 2017 14:25 )   PT: 12.7 sec;   INR: 1.16 ratio         PTT - ( 09 Aug 2017 14:25 )  PTT:30.4 sec  CAPILLARY BLOOD GLUCOSE  138 (11 Aug 2017 13:05)  82 (11 Aug 2017 08:53)  183 (10 Aug 2017 21:33)  112 (10 Aug 2017 18:06)        CARDIAC MARKERS ( 10 Aug 2017 20:35 )  x     / <0.01 ng/mL / 78 U/L / x     / 2.7 ng/mL  CARDIAC MARKERS ( 10 Aug 2017 06:49 )  x     / <0.01 ng/mL / 86 U/L / x     / 3.1 ng/mL  CARDIAC MARKERS ( 10 Aug 2017 00:02 )  x     / <0.01 ng/mL / 71 U/L / x     / 2.9 ng/mL  CARDIAC MARKERS ( 09 Aug 2017 17:44 )  x     / <0.01 ng/mL / 80 U/L / x     / 3.3 ng/mL  CARDIAC MARKERS ( 09 Aug 2017 14:25 )  x     / <0.01 ng/mL / x     / x     / x              RADIOLOGY & ADDITIONAL TESTS:    Imaging Personally Reviewed:  [x] YES  [ ] NO    Consultant(s) Notes Reviewed:  [x] YES  [ ] NO      MEDICATIONS  (STANDING):  heparin  Injectable 5000 Unit(s) SubCutaneous every 8 hours  aspirin enteric coated 81 milliGRAM(s) Oral daily  clopidogrel Tablet 75 milliGRAM(s) Oral at bedtime  gabapentin 200 milliGRAM(s) Oral at bedtime  gabapentin 100 milliGRAM(s) Oral <User Schedule>  insulin glargine Injectable (LANTUS) 6 Unit(s) SubCutaneous every morning  insulin glargine Injectable (LANTUS) 6 Unit(s) SubCutaneous at bedtime  insulin lispro (HumaLOG) corrective regimen sliding scale   SubCutaneous three times a day before meals  insulin lispro (HumaLOG) corrective regimen sliding scale   SubCutaneous at bedtime  dextrose 5%. 1000 milliLiter(s) (50 mL/Hr) IV Continuous <Continuous>  dextrose 50% Injectable 12.5 Gram(s) IV Push once  dextrose 50% Injectable 25 Gram(s) IV Push once  dextrose 50% Injectable 25 Gram(s) IV Push once  furosemide   Injectable 20 milliGRAM(s) IV Push daily  isosorbide   mononitrate ER Tablet (IMDUR) 60 milliGRAM(s) Oral daily    MEDICATIONS  (PRN):  dextrose Gel 1 Dose(s) Oral once PRN Blood Glucose LESS THAN 70 milliGRAM(s)/deciliter  glucagon  Injectable 1 milliGRAM(s) IntraMuscular once PRN Glucose LESS THAN 70 milligrams/deciliter      Care Discussed with Consultants/Other Providers [x] YES  [ ] NO    HEALTH ISSUES - PROBLEM Dx:  High degree atrioventricular block: High degree atrioventricular block  Acute on chronic systolic (congestive) heart failure: Acute on chronic systolic (congestive) heart failure  Chronic stable angina: Chronic stable angina  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

## 2017-08-11 NOTE — CONSULT NOTE ADULT - SUBJECTIVE AND OBJECTIVE BOX
Mr. Asencio is a 89 yo gentleman w/ hx of DM, CAD s/p CABG (2005), ICMO (EF 15-20% as per 2d Echo 8/2017) who was initially admitted for SOB/orthopnea/LE edema.  Pt seen/evaluated by primary cardiologist during this admission.  On 8/10/2017, "3.78 sec pause noted on telemetry".  Pt denied CP/dizziness, or any syncopal events.  Rest of resp sx's improved.  General cardiologist requested EP service to evaluate pt for this.            PMH:   CAD (coronary artery disease)  DM type 2 (diabetes mellitus, type 2)  HLD (hyperlipidemia)  Essential hypertension      PSH:   S/P CABG (coronary artery bypass graft)      Medications:   heparin  Injectable 5000 Unit(s) SubCutaneous every 8 hours  aspirin enteric coated 81 milliGRAM(s) Oral daily  clopidogrel Tablet 75 milliGRAM(s) Oral at bedtime  gabapentin 200 milliGRAM(s) Oral at bedtime  gabapentin 100 milliGRAM(s) Oral <User Schedule>  insulin glargine Injectable (LANTUS) 6 Unit(s) SubCutaneous every morning  insulin glargine Injectable (LANTUS) 6 Unit(s) SubCutaneous at bedtime  insulin lispro (HumaLOG) corrective regimen sliding scale   SubCutaneous three times a day before meals  insulin lispro (HumaLOG) corrective regimen sliding scale   SubCutaneous at bedtime  dextrose 5%. 1000 milliLiter(s) IV Continuous <Continuous>  dextrose Gel 1 Dose(s) Oral once PRN  dextrose 50% Injectable 12.5 Gram(s) IV Push once  dextrose 50% Injectable 25 Gram(s) IV Push once  dextrose 50% Injectable 25 Gram(s) IV Push once  glucagon  Injectable 1 milliGRAM(s) IntraMuscular once PRN  furosemide   Injectable 20 milliGRAM(s) IV Push daily  isosorbide   mononitrate ER Tablet (IMDUR) 30 milliGRAM(s) Oral daily      Allergies:  No Known Allergies      FAMILY HISTORY:  Family history of heart disease (Father)      Social History:  Smoking History:  Alcohol Use:  Drug Use:    Review of Systems:  REVIEW OF SYSTEMS:    CONSTITUTIONAL: No weakness, fevers or chills  EYES/ENT: No visual changes;  No dysphagia  NECK: No pain or stiffness, no JVD  RESPIRATORY: No cough, wheezing, hemoptysis; No shortness of breath  CARDIOVASCULAR: no chest pain or palpitations; No lower extremity edema  GASTROINTESTINAL: No abdominal or epigastric pain. No nausea, vomiting, or hematemesis; No diarrhea or constipation. No melena or hematochezia.  BACK: No back pain  GENITOURINARY: No dysuria, frequency or hematuria  NEUROLOGICAL: No numbness or weakness  SKIN: No itching, burning, rashes, or lesions   All other review of systems is negative unless indicated above.    Physical Exam:  T(F): 98.6 (08-10), Max: 98.6 (08-10)  HR: 79 (08-10) (75 - 85)  BP: 132/77 (08-10) (114/70 - 149/85)  RR: 18 (08-10)  SpO2: 99% (08-10)    GENERAL: No acute distress, well-developed  HEAD:  Atraumatic, Normocephalic  ENT: EOMI, PERRLA, conjunctiva and sclera clear, Neck supple, No JVD, moist mucosa  CHEST/LUNG: Clear to auscultation bilaterally; No wheeze, equal breath sounds bilaterally   BACK: No spinal tenderness  HEART: Regular rate and rhythm; No murmurs, rubs, or gallops  ABDOMEN: Soft, Nontender, Nondistended; Bowel sounds present  EXTREMITIES:  bilateral pitting edema; improved   PSYCH: Nl behavior, nl affect  NEUROLOGY: AAOx3, non-focal, cranial nerves intact  SKIN: Normal color, No rashes or lesions  LINES:    Cardiovascular Diagnostic Testing:    ECG strip: NSR w/ three non-conducted p; seems like a transient AV flakita block:       Labs: Personally reviewed                        12.7   7.3   )-----------( 219      ( 10 Aug 2017 06:46 )             39.2     08-10    141  |  105  |  23  ----------------------------<  128<H>  4.8   |  24  |  1.44<H>    Ca    9.1      10 Aug 2017 06:49  Phos  2.8     08-10  Mg     2.0     08-10    TPro  6.6  /  Alb  3.5  /  TBili  0.4  /  DBili  x   /  AST  15  /  ALT  13  /  AlkPhos  109  08-09    PT/INR - ( 09 Aug 2017 14:25 )   PT: 12.7 sec;   INR: 1.16 ratio         PTT - ( 09 Aug 2017 14:25 )  PTT:30.4 sec  CARDIAC MARKERS ( 10 Aug 2017 20:35 )  x     / <0.01 ng/mL / 78 U/L / x     / 2.7 ng/mL  CARDIAC MARKERS ( 10 Aug 2017 06:49 )  x     / <0.01 ng/mL / 86 U/L / x     / 3.1 ng/mL  CARDIAC MARKERS ( 10 Aug 2017 00:02 )  x     / <0.01 ng/mL / 71 U/L / x     / 2.9 ng/mL  CARDIAC MARKERS ( 09 Aug 2017 17:44 )  x     / <0.01 ng/mL / 80 U/L / x     / 3.3 ng/mL  CARDIAC MARKERS ( 09 Aug 2017 14:25 )  x     / <0.01 ng/mL / x     / x     / x          Serum Pro-Brain Natriuretic Peptide: 5796 pg/mL (08-09 @ 14:25)      Hemoglobin A1C, Whole Blood: 7.9 % (08-10 @ 08:05)    Assessment  89 yo gentleman w/ hx of DM, CAD s/p CABG (2005), ICMO (EF 15-20% as per 2d Echo 8/2017) who was initially admitted for SOB/orthopnea/LE edema.  Now w/ strip showing three non-conducted p waves, transition AV flakita block.  However pt asymptomatic.      Plan  - recommend continuing to monitor electrolytes, K>4, Mg>2   - cont rest of medical therapy as per primary cardiologist   - will discuss in am rounds w/ EP service     Emeka Hernandez MD   [facility] PGY -4 cardiology fellow

## 2017-08-11 NOTE — CONSULT NOTE ADULT - PROBLEM SELECTOR RECOMMENDATION 9
Renal function at baseline on admission (as reviewed under patient's alternate MRN to be 1.3-1.6 in 12/2016 and 01/2017)  Possibly from longstanding history of DM/ diabetic nephropathy  check UA, urine protein/creatinine ratio  will check renal sono (nonemergent)  Dose meds for eGFR ~35-40  Would tolerate mild worsening of azotemia to optimize volume status  patient advised to avoid NSAID use in light of cardiac and renal history  phos at goal, no need for binders

## 2017-08-11 NOTE — CONSULT NOTE ADULT - SUBJECTIVE AND OBJECTIVE BOX
[facility]  [phone]  Dr. Pastor (covering for Dr. Brady)  NEPHROLOGY  INITIAL CONSULT NOTE  --------------------------------------------------------------------------------  HPI: 88 y/o male with below stated PMHx, as well as CHF EF 29% on stress test from April, 2017, CKD with baseline Creatinine 1.3-1.6, who presents with worsening SOB/LE edema x 3-4 days.  Patient reports alleviation of symptoms of SOB since in hospital as well as improving LE edema.  Renal evaluation requested in light of rising creatinine while being diuresed.  Patient denies knowledge of CKD and states he has never seen a nephrologist.  Patient has been found to have AV block with ventricular pauses on telemetry and is being evaluated for PPM +/-AICD.    Of note, patient reports NSAID use (Aleve) every other day for vague abdominal discomfort.        PAST HISTORY  --------------------------------------------------------------------------------  PAST MEDICAL & SURGICAL HISTORY:  CAD (coronary artery disease)  DM type 2 (diabetes mellitus, type 2)  HLD (hyperlipidemia)  Essential hypertension  S/P CABG (coronary artery bypass graft)    FAMILY HISTORY:  Family history of heart disease (Father)  Denies known history of kidney disease    PAST SOCIAL HISTORY:  Quit tobacco ~45 years ago  ETOH: denies  Illicit Drug: denies    ALLERGIES & MEDICATIONS  --------------------------------------------------------------------------------  Allergies    No Known Allergies    Intolerances      Standing Inpatient Medications  heparin  Injectable 5000 Unit(s) SubCutaneous every 8 hours  aspirin enteric coated 81 milliGRAM(s) Oral daily  clopidogrel Tablet 75 milliGRAM(s) Oral at bedtime  gabapentin 200 milliGRAM(s) Oral at bedtime  gabapentin 100 milliGRAM(s) Oral <User Schedule>  insulin glargine Injectable (LANTUS) 6 Unit(s) SubCutaneous every morning  insulin glargine Injectable (LANTUS) 6 Unit(s) SubCutaneous at bedtime  insulin lispro (HumaLOG) corrective regimen sliding scale   SubCutaneous three times a day before meals  insulin lispro (HumaLOG) corrective regimen sliding scale   SubCutaneous at bedtime  dextrose 5%. 1000 milliLiter(s) IV Continuous <Continuous>  dextrose 50% Injectable 12.5 Gram(s) IV Push once  dextrose 50% Injectable 25 Gram(s) IV Push once  dextrose 50% Injectable 25 Gram(s) IV Push once  furosemide   Injectable 20 milliGRAM(s) IV Push daily  isosorbide   mononitrate ER Tablet (IMDUR) 60 milliGRAM(s) Oral daily    PRN Inpatient Medications  dextrose Gel 1 Dose(s) Oral once PRN  glucagon  Injectable 1 milliGRAM(s) IntraMuscular once PRN      REVIEW OF SYSTEMS  --------------------------------------------------------------------------------  General: + fatigue, + weight gain  CVS:  + chest pressure PTA, denies currently. Denies palpitations. +orthopnea  Respiratory:  + BARCENAS denies cough/wheeze  GI:  Denies abdominal pain/N/V/D  : Denies hematuria/dysuria/oliguria  Neuro: Denies dizziness/vertigo    All other systems were reviewed and are negative, except as noted.    VITALS/PHYSICAL EXAM  --------------------------------------------------------------------------------  T(C): 36.5 (08-11-17 @ 12:39), Max: 37 (08-10-17 @ 21:02)  HR: 83 (08-11-17 @ 12:39) (69 - 83)  BP: 118/74 (08-11-17 @ 12:39) (109/66 - 132/77)  RR: 18 (08-11-17 @ 12:39) (18 - 18)  SpO2: 99% (08-11-17 @ 12:39) (94% - 99%)  Wt(kg): --  Height (cm): 180.34 (08-09-17 @ 22:35)  Weight (kg): 110.3 (08-09-17 @ 22:35)  BMI (kg/m2): 33.9 (08-09-17 @ 22:35)  BSA (m2): 2.29 (08-09-17 @ 22:35)      08-10-17 @ 07:01  -  08-11-17 @ 07:00  --------------------------------------------------------  IN: 720 mL / OUT: 200 mL / NET: 520 mL      Physical Exam:  	Gen: NAD, well-appearing  	Pulm: CTA B/L no w/r/r  	CV: RRR, S1S2  	Abd: +BS, soft, nontender/nondistended  	: No suprapubic tenderness, no campbell  	Extremity: B/L LE pitting edema to upper shins  	Neuro: A&Ox3  	  LABS/STUDIES  --------------------------------------------------------------------------------              12.7   7.3   >-----------<  219      [08-10-17 @ 06:46]              39.2     141  |  105  |  27  ----------------------------<  90      [08-11-17 @ 07:21]  4.6   |  26  |  1.63        Ca     9.0     [08-11-17 @ 07:21]      Mg     2.1     [08-11-17 @ 07:21]      Phos  2.8     [08-10-17 @ 06:49]    TPro  6.6  /  Alb  3.5  /  TBili  0.4  /  DBili  x   /  AST  15  /  ALT  13  /  AlkPhos  109  [08-09-17 @ 17:44]        Troponin <0.01      [08-10-17 @ 20:35]  CK 78      [08-10-17 @ 20:35]    eGFR if : 43 mL/min/1.73M2 (08-11 @ 07:21)    eGFR if Non African American: 37 mL/min/1.73M2 (08-11 @ 07:21)    Creatinine Trend:  SCr 1.63 [08-11 @ 07:21]  SCr 1.44 [08-10 @ 06:49]  SCr 1.47 [08-09 @ 17:44]  SCr 1.48 [08-09 @ 14:25]        HbA1c 7.9      [08-10-17 @ 08:05]

## 2017-08-11 NOTE — PROGRESS NOTE ADULT - SUBJECTIVE AND OBJECTIVE BOX
[facility] Cardiology Progress Note  _______________________________    Pt. seen and examined. No new cardiac-related complaints.    Telemetry - sinus 70-80. Overnight an episode of high degree AV block with ventricular pause of 3.8 seconds.    T(C): 36.5 (08-11-17 @ 04:15), Max: 37 (08-10-17 @ 21:02)  HR: 73 (08-11-17 @ 06:04) (69 - 85)  BP: 124/74 (08-11-17 @ 06:04) (109/66 - 149/85)  RR: 18 (08-11-17 @ 04:15) (18 - 18)  SpO2: 94% (08-11-17 @ 04:15) (94% - 99%)  I&O's Summary    10 Aug 2017 07:01  -  11 Aug 2017 07:00  --------------------------------------------------------  IN: 720 mL / OUT: 200 mL / NET: 520 mL        PHYSICAL EXAM:  GENERAL: Alert, NAD  NECK: Supple, No JVD, No carotid bruit.  CHEST/LUNG: Mild basilar crackles  HEART: S1 S2 normal, RRR,  No murmurs, rubs, or gallops  ABDOMEN: Soft, Nontender, Nondistended; Bowel sounds present  EXTREMITIES: 1+ Edema.      LABS:                        12.7   7.3   )-----------( 219      ( 10 Aug 2017 06:46 )             39.2     08-11    141  |  105  |  27<H>  ----------------------------<  90  4.6   |  26  |  1.63<H>    Ca    9.0      11 Aug 2017 07:21  Phos  2.8     08-10  Mg     2.0     08-10    TPro  6.6  /  Alb  3.5  /  TBili  0.4  /  DBili  x   /  AST  15  /  ALT  13  /  AlkPhos  109  08-09    PT/INR - ( 09 Aug 2017 14:25 )   PT: 12.7 sec;   INR: 1.16 ratio         PTT - ( 09 Aug 2017 14:25 )  PTT:30.4 sec  CARDIAC MARKERS ( 10 Aug 2017 20:35 )  x     / <0.01 ng/mL / 78 U/L / x     / 2.7 ng/mL  CARDIAC MARKERS ( 10 Aug 2017 06:49 )  x     / <0.01 ng/mL / 86 U/L / x     / 3.1 ng/mL  CARDIAC MARKERS ( 10 Aug 2017 00:02 )  x     / <0.01 ng/mL / 71 U/L / x     / 2.9 ng/mL  CARDIAC MARKERS ( 09 Aug 2017 17:44 )  x     / <0.01 ng/mL / 80 U/L / x     / 3.3 ng/mL  CARDIAC MARKERS ( 09 Aug 2017 14:25 )  x     / <0.01 ng/mL / x     / x     / x          MEDICATIONS  (STANDING):  heparin  Injectable 5000 Unit(s) SubCutaneous every 8 hours  aspirin enteric coated 81 milliGRAM(s) Oral daily  clopidogrel Tablet 75 milliGRAM(s) Oral at bedtime  gabapentin 200 milliGRAM(s) Oral at bedtime  gabapentin 100 milliGRAM(s) Oral <User Schedule>  insulin glargine Injectable (LANTUS) 6 Unit(s) SubCutaneous every morning  insulin glargine Injectable (LANTUS) 6 Unit(s) SubCutaneous at bedtime  insulin lispro (HumaLOG) corrective regimen sliding scale   SubCutaneous three times a day before meals  insulin lispro (HumaLOG) corrective regimen sliding scale   SubCutaneous at bedtime  dextrose 5%. 1000 milliLiter(s) (50 mL/Hr) IV Continuous <Continuous>  dextrose 50% Injectable 12.5 Gram(s) IV Push once  dextrose 50% Injectable 25 Gram(s) IV Push once  dextrose 50% Injectable 25 Gram(s) IV Push once  furosemide   Injectable 20 milliGRAM(s) IV Push daily  isosorbide   mononitrate ER Tablet (IMDUR) 30 milliGRAM(s) Oral daily    MEDICATIONS  (PRN):  dextrose Gel 1 Dose(s) Oral once PRN Blood Glucose LESS THAN 70 milliGRAM(s)/deciliter  glucagon  Injectable 1 milliGRAM(s) IntraMuscular once PRN Glucose LESS THAN 70 milligrams/deciliter      RADIOLOGY & ADDITIONAL TESTS:

## 2017-08-11 NOTE — PROGRESS NOTE ADULT - PROBLEM SELECTOR PLAN 4
-  - Echo shows severely reduced LV systolic function.  - EP attending follow-up re: whether he is a candidate for AICD.  - follow-up official report.  - monitor I/O and daily weights.    Jermaine Santos M.D., [facility]  [phone]

## 2017-08-11 NOTE — CONSULT NOTE ADULT - ASSESSMENT
88 y/o male with sCHF, CKD3, DM2 who presents with worsening SOB/orthopnea, LE edema and rising creatinine in setting of diuresis:

## 2017-08-12 DIAGNOSIS — R10.9 UNSPECIFIED ABDOMINAL PAIN: ICD-10-CM

## 2017-08-12 LAB
ANION GAP SERPL CALC-SCNC: 11 MMOL/L — SIGNIFICANT CHANGE UP (ref 5–17)
APPEARANCE UR: CLEAR — SIGNIFICANT CHANGE UP
BILIRUB UR-MCNC: NEGATIVE — SIGNIFICANT CHANGE UP
BUN SERPL-MCNC: 25 MG/DL — HIGH (ref 7–23)
CALCIUM SERPL-MCNC: 8.9 MG/DL — SIGNIFICANT CHANGE UP (ref 8.4–10.5)
CHLORIDE SERPL-SCNC: 105 MMOL/L — SIGNIFICANT CHANGE UP (ref 96–108)
CO2 SERPL-SCNC: 25 MMOL/L — SIGNIFICANT CHANGE UP (ref 22–31)
COLOR SPEC: COLORLESS — SIGNIFICANT CHANGE UP
CREAT ?TM UR-MCNC: 38 MG/DL — SIGNIFICANT CHANGE UP
CREAT SERPL-MCNC: 1.57 MG/DL — HIGH (ref 0.5–1.3)
DIFF PNL FLD: NEGATIVE — SIGNIFICANT CHANGE UP
GLUCOSE SERPL-MCNC: 111 MG/DL — HIGH (ref 70–99)
GLUCOSE UR QL: NEGATIVE — SIGNIFICANT CHANGE UP
KETONES UR-MCNC: NEGATIVE — SIGNIFICANT CHANGE UP
LEUKOCYTE ESTERASE UR-ACNC: NEGATIVE — SIGNIFICANT CHANGE UP
NITRITE UR-MCNC: NEGATIVE — SIGNIFICANT CHANGE UP
PH UR: 6.5 — SIGNIFICANT CHANGE UP (ref 5–8)
POTASSIUM SERPL-MCNC: 4.5 MMOL/L — SIGNIFICANT CHANGE UP (ref 3.5–5.3)
POTASSIUM SERPL-SCNC: 4.5 MMOL/L — SIGNIFICANT CHANGE UP (ref 3.5–5.3)
PROT ?TM UR-MCNC: <4 MG/DL — SIGNIFICANT CHANGE UP (ref 0–12)
PROT UR-MCNC: NEGATIVE — SIGNIFICANT CHANGE UP
PROT/CREAT UR-RTO: SIGNIFICANT CHANGE UP (ref 0–0.2)
SODIUM SERPL-SCNC: 141 MMOL/L — SIGNIFICANT CHANGE UP (ref 135–145)
SP GR SPEC: 1 — LOW (ref 1.01–1.02)
UROBILINOGEN FLD QL: NEGATIVE — SIGNIFICANT CHANGE UP
WBC UR QL: SIGNIFICANT CHANGE UP /HPF (ref 0–5)

## 2017-08-12 RX ADMIN — HEPARIN SODIUM 5000 UNIT(S): 5000 INJECTION INTRAVENOUS; SUBCUTANEOUS at 14:20

## 2017-08-12 RX ADMIN — HEPARIN SODIUM 5000 UNIT(S): 5000 INJECTION INTRAVENOUS; SUBCUTANEOUS at 22:53

## 2017-08-12 RX ADMIN — ISOSORBIDE MONONITRATE 60 MILLIGRAM(S): 60 TABLET, EXTENDED RELEASE ORAL at 12:33

## 2017-08-12 RX ADMIN — GABAPENTIN 100 MILLIGRAM(S): 400 CAPSULE ORAL at 14:20

## 2017-08-12 RX ADMIN — HEPARIN SODIUM 5000 UNIT(S): 5000 INJECTION INTRAVENOUS; SUBCUTANEOUS at 05:52

## 2017-08-12 RX ADMIN — GABAPENTIN 100 MILLIGRAM(S): 400 CAPSULE ORAL at 09:22

## 2017-08-12 RX ADMIN — CLOPIDOGREL BISULFATE 75 MILLIGRAM(S): 75 TABLET, FILM COATED ORAL at 22:53

## 2017-08-12 RX ADMIN — INSULIN GLARGINE 6 UNIT(S): 100 INJECTION, SOLUTION SUBCUTANEOUS at 23:23

## 2017-08-12 RX ADMIN — Medication 20 MILLIGRAM(S): at 05:52

## 2017-08-12 RX ADMIN — Medication 81 MILLIGRAM(S): at 12:33

## 2017-08-12 RX ADMIN — GABAPENTIN 200 MILLIGRAM(S): 400 CAPSULE ORAL at 22:53

## 2017-08-12 RX ADMIN — INSULIN GLARGINE 6 UNIT(S): 100 INJECTION, SOLUTION SUBCUTANEOUS at 09:22

## 2017-08-12 NOTE — PROGRESS NOTE ADULT - SUBJECTIVE AND OBJECTIVE BOX
[facility] Cardiology Progress Note  _______________________________    Pt. seen and examined. Breathing better. Legs still swelling.     Telemetry - sinus 70-90's    T(C): 37.1 (17 @ 04:24), Max: 37.1 (17 @ 04:24)  HR: 80 (17 @ 12:31) (74 - 81)  BP: 126/77 (17 @ 12:31) (126/77 - 144/79)  RR: 18 (17 @ 04:24) (18 - 18)  SpO2: 97% (17 @ 12:31) (96% - 99%)  I&O's Summary    11 Aug 2017 07:01  -  12 Aug 2017 07:00  --------------------------------------------------------  IN: 240 mL / OUT: 0 mL / NET: 240 mL        PHYSICAL EXAM:  GENERAL: Alert, NAD  NECK: Supple, No JVD, No carotid bruit.  CHEST/LUNG: cta b/l.  HEART: S1 S2 normal, RRR,  No murmurs, rubs, or gallops  ABDOMEN: Soft, Nontender, Nondistended; Bowel sounds present  EXTREMITIES: 1+ Edema.    LABS:        141  |  105  |  25<H>  ----------------------------<  111<H>  4.5   |  25  |  1.57<H>    Ca    8.9      12 Aug 2017 06:51  Mg     2.1     08-11        CARDIAC MARKERS ( 10 Aug 2017 20:35 )  x     / <0.01 ng/mL / 78 U/L / x     / 2.7 ng/mL  CARDIAC MARKERS ( 10 Aug 2017 06:49 )  x     / <0.01 ng/mL / 86 U/L / x     / 3.1 ng/mL  CARDIAC MARKERS ( 10 Aug 2017 00:02 )  x     / <0.01 ng/mL / 71 U/L / x     / 2.9 ng/mL  CARDIAC MARKERS ( 09 Aug 2017 17:44 )  x     / <0.01 ng/mL / 80 U/L / x     / 3.3 ng/mL  CARDIAC MARKERS ( 09 Aug 2017 14:25 )  x     / <0.01 ng/mL / x     / x     / x              Urinalysis Basic - ( 12 Aug 2017 07:04 )    Color: x / Appearance: Clear / S.005 / pH: x  Gluc: x / Ketone: Negative  / Bili: Negative / Urobili: Negative   Blood: x / Protein: Negative / Nitrite: Negative   Leuk Esterase: Negative / RBC: x / WBC 0-2 /HPF   Sq Epi: x / Non Sq Epi: x / Bacteria: x        MEDICATIONS  (STANDING):  heparin  Injectable 5000 Unit(s) SubCutaneous every 8 hours  aspirin enteric coated 81 milliGRAM(s) Oral daily  clopidogrel Tablet 75 milliGRAM(s) Oral at bedtime  gabapentin 200 milliGRAM(s) Oral at bedtime  gabapentin 100 milliGRAM(s) Oral <User Schedule>  insulin glargine Injectable (LANTUS) 6 Unit(s) SubCutaneous every morning  insulin glargine Injectable (LANTUS) 6 Unit(s) SubCutaneous at bedtime  insulin lispro (HumaLOG) corrective regimen sliding scale   SubCutaneous three times a day before meals  insulin lispro (HumaLOG) corrective regimen sliding scale   SubCutaneous at bedtime  dextrose 5%. 1000 milliLiter(s) (50 mL/Hr) IV Continuous <Continuous>  dextrose 50% Injectable 12.5 Gram(s) IV Push once  dextrose 50% Injectable 25 Gram(s) IV Push once  dextrose 50% Injectable 25 Gram(s) IV Push once  furosemide   Injectable 20 milliGRAM(s) IV Push daily  isosorbide   mononitrate ER Tablet (IMDUR) 60 milliGRAM(s) Oral daily    MEDICATIONS  (PRN):  dextrose Gel 1 Dose(s) Oral once PRN Blood Glucose LESS THAN 70 milliGRAM(s)/deciliter  glucagon  Injectable 1 milliGRAM(s) IntraMuscular once PRN Glucose LESS THAN 70 milligrams/deciliter      RADIOLOGY & ADDITIONAL TESTS:

## 2017-08-12 NOTE — PHYSICAL THERAPY INITIAL EVALUATION ADULT - PERTINENT HX OF CURRENT PROBLEM, REHAB EVAL
Pt is an 88 yo man with PMH of HTN, HLD, DMT2, CAD s/p CABG (~12 years ago) presenting with symptoms of CP and SOB the past few days. Patient states that last symptoms were 3 am where he felt shortness of breath and chest tightness. Patient denies radiation of symptoms. Patient states he felt a bit clammy/sweaty, but denies associated palpitations, nausea, emesis. He also feels that he had put on more weight and thinks that his LE has worsening edema.  Continued...

## 2017-08-12 NOTE — PROGRESS NOTE ADULT - SUBJECTIVE AND OBJECTIVE BOX
Patient is a 89y old  Male who presents with a chief complaint of chest pain SOB (10 Aug 2017 01:47)      SUBJECTIVE / OVERNIGHT EVENTS:    pt feeling well currently, pt notes sob earlier which has resolved.  no cp abd pain     Vital Signs Last 24 Hrs  T(C): 37.1 (12 Aug 2017 04:24), Max: 37.1 (12 Aug 2017 04:24)  T(F): 98.7 (12 Aug 2017 04:24), Max: 98.7 (12 Aug 2017 04:24)  HR: 80 (12 Aug 2017 12:31) (74 - 83)  BP: 126/77 (12 Aug 2017 12:31) (118/74 - 144/79)  BP(mean): --  RR: 18 (12 Aug 2017 04:24) (18 - 18)  SpO2: 97% (12 Aug 2017 12:31) (96% - 99%)  I&O's Summary    11 Aug 2017 07:01  -  12 Aug 2017 07:00  --------------------------------------------------------  IN: 240 mL / OUT: 0 mL / NET: 240 mL            LABS:        141  |  105  |  25<H>  ----------------------------<  111<H>  4.5   |  25  |  1.57<H>    Ca    8.9      12 Aug 2017 06:51  Mg     2.1             CAPILLARY BLOOD GLUCOSE  99 (12 Aug 2017 09:00)  139 (11 Aug 2017 21:22)  105 (11 Aug 2017 18:06)  138 (11 Aug 2017 13:05)        CARDIAC MARKERS ( 10 Aug 2017 20:35 )  x     / <0.01 ng/mL / 78 U/L / x     / 2.7 ng/mL      Urinalysis Basic - ( 12 Aug 2017 07:04 )    Color: x / Appearance: Clear / S.005 / pH: x  Gluc: x / Ketone: Negative  / Bili: Negative / Urobili: Negative   Blood: x / Protein: Negative / Nitrite: Negative   Leuk Esterase: Negative / RBC: x / WBC 0-2 /HPF   Sq Epi: x / Non Sq Epi: x / Bacteria: x        RADIOLOGY & ADDITIONAL TESTS:    Imaging Personally Reviewed:  [x] YES  [ ] NO    Consultant(s) Notes Reviewed:  [x] YES  [ ] NO      MEDICATIONS  (STANDING):  heparin  Injectable 5000 Unit(s) SubCutaneous every 8 hours  aspirin enteric coated 81 milliGRAM(s) Oral daily  clopidogrel Tablet 75 milliGRAM(s) Oral at bedtime  gabapentin 200 milliGRAM(s) Oral at bedtime  gabapentin 100 milliGRAM(s) Oral <User Schedule>  insulin glargine Injectable (LANTUS) 6 Unit(s) SubCutaneous every morning  insulin glargine Injectable (LANTUS) 6 Unit(s) SubCutaneous at bedtime  insulin lispro (HumaLOG) corrective regimen sliding scale   SubCutaneous three times a day before meals  insulin lispro (HumaLOG) corrective regimen sliding scale   SubCutaneous at bedtime  dextrose 5%. 1000 milliLiter(s) (50 mL/Hr) IV Continuous <Continuous>  dextrose 50% Injectable 12.5 Gram(s) IV Push once  dextrose 50% Injectable 25 Gram(s) IV Push once  dextrose 50% Injectable 25 Gram(s) IV Push once  furosemide   Injectable 20 milliGRAM(s) IV Push daily  isosorbide   mononitrate ER Tablet (IMDUR) 60 milliGRAM(s) Oral daily    MEDICATIONS  (PRN):  dextrose Gel 1 Dose(s) Oral once PRN Blood Glucose LESS THAN 70 milliGRAM(s)/deciliter  glucagon  Injectable 1 milliGRAM(s) IntraMuscular once PRN Glucose LESS THAN 70 milligrams/deciliter      Care Discussed with Consultants/Other Providers [x] YES  [ ] NO    HEALTH ISSUES - PROBLEM Dx:  High degree atrioventricular block: High degree atrioventricular block  Acute on chronic systolic (congestive) heart failure: Acute on chronic systolic (congestive) heart failure  Chronic stable angina: Chronic stable angina  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

## 2017-08-12 NOTE — PROGRESS NOTE ADULT - SUBJECTIVE AND OBJECTIVE BOX
[facility]   [phone]  Dr. Pastor (covering for Dr. Brady)  RENAL FOLLOW UP NOTE  --------------------------------------------------------------------------------  Patient seen and examined.  Denies chest pain / palpitations / SOB.  Reports some abdominal discomfort, denies N/V.    PAST HISTORY  --------------------------------------------------------------------------------  No significant changes to PMH, PSH, FHx, SHx, unless otherwise noted    ALLERGIES & MEDICATIONS  --------------------------------------------------------------------------------  Allergies    No Known Allergies    Intolerances      Standing Inpatient Medications  heparin  Injectable 5000 Unit(s) SubCutaneous every 8 hours  aspirin enteric coated 81 milliGRAM(s) Oral daily  clopidogrel Tablet 75 milliGRAM(s) Oral at bedtime  gabapentin 200 milliGRAM(s) Oral at bedtime  gabapentin 100 milliGRAM(s) Oral <User Schedule>  insulin glargine Injectable (LANTUS) 6 Unit(s) SubCutaneous every morning  insulin glargine Injectable (LANTUS) 6 Unit(s) SubCutaneous at bedtime  insulin lispro (HumaLOG) corrective regimen sliding scale   SubCutaneous three times a day before meals  insulin lispro (HumaLOG) corrective regimen sliding scale   SubCutaneous at bedtime  dextrose 5%. 1000 milliLiter(s) IV Continuous <Continuous>  dextrose 50% Injectable 12.5 Gram(s) IV Push once  dextrose 50% Injectable 25 Gram(s) IV Push once  dextrose 50% Injectable 25 Gram(s) IV Push once  furosemide   Injectable 20 milliGRAM(s) IV Push daily  isosorbide   mononitrate ER Tablet (IMDUR) 60 milliGRAM(s) Oral daily    PRN Inpatient Medications  dextrose Gel 1 Dose(s) Oral once PRN  glucagon  Injectable 1 milliGRAM(s) IntraMuscular once PRN      REVIEW OF SYSTEMS  --------------------------------------------------------------------------------  Gen: denies fatigue, fevers/chills, weakness  CVS: denies chest pain/palpitations  Resp: denies SOB/Cough  GI: Denies N/V.  + abdominal discomfort  : Denies dysuria/oliguria/hematuria    All other systems were reviewed and are negative, except as noted.    VITALS/PHYSICAL EXAM  --------------------------------------------------------------------------------  T(C): 37.1 (08-12-17 @ 12:50), Max: 37.1 (08-12-17 @ 04:24)  HR: 80 (08-12-17 @ 12:50) (74 - 81)  BP: 129/84 (08-12-17 @ 12:50) (126/77 - 144/79)  RR: 18 (08-12-17 @ 12:50) (18 - 18)  SpO2: 97% (08-12-17 @ 12:50) (96% - 99%)  Wt(kg): --        08-11-17 @ 07:01  -  08-12-17 @ 07:00  --------------------------------------------------------  IN: 240 mL / OUT: 0 mL / NET: 240 mL    08-12-17 @ 07:01  -  08-12-17 @ 16:47  --------------------------------------------------------  IN: 480 mL / OUT: 0 mL / NET: 480 mL      Physical Exam:  	Gen: NAD, well-appearing  	Pulm: CTA B/L ant/lat fields  	CV: RRR, S1S2  	Abd: +BS, soft, nondistended. RUQ TTP.  No rebound/guarding  	: No suprapubic tenderness.  no campbell              Extremity: No cyanosis. Improving LE edema  	Neuro: A&O x 3  	Psych: Normal affect and mood    LABS/STUDIES  --------------------------------------------------------------------------------    141  |  105  |  25  ----------------------------<  111      [08-12-17 @ 06:51]  4.5   |  25  |  1.57        Ca     8.9     [08-12-17 @ 06:51]      Mg     2.1     [08-11-17 @ 07:21]          Troponin <0.01      [08-10-17 @ 20:35]  CK 78      [08-10-17 @ 20:35]    eGFR if Non African American: 39 mL/min/1.73M2 (08-12 @ 06:51)  eGFR if : 45 mL/min/1.73M2 (08-12 @ 06:51)    Creatinine Trend:  SCr 1.57 [08-12 @ 06:51]  SCr 1.63 [08-11 @ 07:21]  SCr 1.44 [08-10 @ 06:49]  SCr 1.47 [08-09 @ 17:44]  SCr 1.48 [08-09 @ 14:25]              Urinalysis - [08-12-17 @ 07:04]      Color  / Appearance Clear / SG 1.005 / pH 6.5      Gluc Negative / Ketone Negative  / Bili Negative / Urobili Negative       Blood Negative / Protein Negative / Leuk Est Negative / Nitrite Negative      RBC  / WBC 0-2 / Hyaline  / Gran  / Sq Epi  / Non Sq Epi  / Bacteria     Urine Creatinine 38      [08-12-17 @ 09:06]  Urine Protein <4      [08-12-17 @ 09:06]    HbA1c 7.9      [08-10-17 @ 08:05]

## 2017-08-12 NOTE — PHYSICAL THERAPY INITIAL EVALUATION ADULT - ADDITIONAL COMMENTS
pertinent history continued...  Patient states that symptoms persisted for a few hours. Patient did not take any medication at home or do anything for alleviation. Per patient typically sleeps with 2 pillow and lays on his side. He does not think he need to sit up at night to feel more comfortable.  Patients states he has had similar episodes of chest tightness and shortness of breath when ambulating for long distances. Patient denies recent stress tests in the past. Per daughter, Jena, patient has been on Lasix in the past but has been discontinued. pertinent history continued...  Patient states that symptoms persisted for a few hours. Patient did not take any medication at home or do anything for alleviation. Per patient typically sleeps with 2 pillow and lays on his side. He does not think he need to sit up at night to feel more comfortable.  Patients states he has had similar episodes of chest tightness and shortness of breath when ambulating for long distances. Patient denies recent stress tests in the past. Per daughter, Jena, patient has been on Lasix in the past but has been discontinued.   8/11- pt w/ strip showing three non-conducted p waves, transition AV flakita block.  However pt asymptomatic.

## 2017-08-12 NOTE — PROGRESS NOTE ADULT - PROBLEM SELECTOR PLAN 4
-  - cont lasix 20 mg IVP daily  - monitor I/O and daily weights.  - Echo shows severely reduced LV systolic function.  - Plan for AICD as noted above.    Jermaine Santos M.D., [facility]  [phone]

## 2017-08-12 NOTE — PHYSICAL THERAPY INITIAL EVALUATION ADULT - IMPAIRMENTS CONTRIBUTING TO GAIT DEVIATIONS, PT EVAL
Further ambulation limited secondary to pt awaiting ppm placement 8/14/17/impaired balance/narrow base of support/decreased strength

## 2017-08-12 NOTE — PHYSICAL THERAPY INITIAL EVALUATION ADULT - DISCHARGE DISPOSITION, PT EVAL
home w/ home PT/D/C plan to be reassessed pot ppm placement home w/ home PT/D/C plan to be reassessed psot ppm placement

## 2017-08-12 NOTE — PROGRESS NOTE ADULT - PROBLEM SELECTOR PLAN 1
Renal function at baseline on admission (as reviewed under patient's alternate MRN to be 1.3-1.6 in 12/2016 and 01/2017), tolerating diuresis  no protein in UA, unable to quantify spot ratio; unlikely diabetic nephropathy  renal sono pending to evaluate for size  Dose meds for eGFR ~35-40  Would tolerate mild worsening of azotemia to optimize volume status  patient advised to avoid NSAID use in light of cardiac and renal history  phos at goal, no need for binders.

## 2017-08-12 NOTE — PHYSICAL THERAPY INITIAL EVALUATION ADULT - LIVES WITH, PROFILE
Pt lives with spouse at [facility]. Pt has 0 stairs to negotiate, (+)elevator within.

## 2017-08-13 LAB
ANION GAP SERPL CALC-SCNC: 11 MMOL/L — SIGNIFICANT CHANGE UP (ref 5–17)
BUN SERPL-MCNC: 24 MG/DL — HIGH (ref 7–23)
CALCIUM SERPL-MCNC: 9.6 MG/DL — SIGNIFICANT CHANGE UP (ref 8.4–10.5)
CHLORIDE SERPL-SCNC: 102 MMOL/L — SIGNIFICANT CHANGE UP (ref 96–108)
CO2 SERPL-SCNC: 26 MMOL/L — SIGNIFICANT CHANGE UP (ref 22–31)
CREAT SERPL-MCNC: 1.49 MG/DL — HIGH (ref 0.5–1.3)
GLUCOSE SERPL-MCNC: 133 MG/DL — HIGH (ref 70–99)
POTASSIUM SERPL-MCNC: 4.5 MMOL/L — SIGNIFICANT CHANGE UP (ref 3.5–5.3)
POTASSIUM SERPL-SCNC: 4.5 MMOL/L — SIGNIFICANT CHANGE UP (ref 3.5–5.3)
SODIUM SERPL-SCNC: 139 MMOL/L — SIGNIFICANT CHANGE UP (ref 135–145)

## 2017-08-13 PROCEDURE — 93970 EXTREMITY STUDY: CPT | Mod: 26

## 2017-08-13 PROCEDURE — 76775 US EXAM ABDO BACK WALL LIM: CPT | Mod: 26

## 2017-08-13 RX ADMIN — HEPARIN SODIUM 5000 UNIT(S): 5000 INJECTION INTRAVENOUS; SUBCUTANEOUS at 15:11

## 2017-08-13 RX ADMIN — CLOPIDOGREL BISULFATE 75 MILLIGRAM(S): 75 TABLET, FILM COATED ORAL at 22:02

## 2017-08-13 RX ADMIN — Medication 81 MILLIGRAM(S): at 12:07

## 2017-08-13 RX ADMIN — INSULIN GLARGINE 6 UNIT(S): 100 INJECTION, SOLUTION SUBCUTANEOUS at 09:00

## 2017-08-13 RX ADMIN — Medication 20 MILLIGRAM(S): at 06:59

## 2017-08-13 RX ADMIN — GABAPENTIN 100 MILLIGRAM(S): 400 CAPSULE ORAL at 15:10

## 2017-08-13 RX ADMIN — GABAPENTIN 200 MILLIGRAM(S): 400 CAPSULE ORAL at 22:02

## 2017-08-13 RX ADMIN — INSULIN GLARGINE 6 UNIT(S): 100 INJECTION, SOLUTION SUBCUTANEOUS at 22:03

## 2017-08-13 RX ADMIN — GABAPENTIN 100 MILLIGRAM(S): 400 CAPSULE ORAL at 09:43

## 2017-08-13 RX ADMIN — HEPARIN SODIUM 5000 UNIT(S): 5000 INJECTION INTRAVENOUS; SUBCUTANEOUS at 22:02

## 2017-08-13 RX ADMIN — ISOSORBIDE MONONITRATE 60 MILLIGRAM(S): 60 TABLET, EXTENDED RELEASE ORAL at 12:07

## 2017-08-13 RX ADMIN — Medication 1: at 18:05

## 2017-08-13 RX ADMIN — HEPARIN SODIUM 5000 UNIT(S): 5000 INJECTION INTRAVENOUS; SUBCUTANEOUS at 07:00

## 2017-08-13 NOTE — PROGRESS NOTE ADULT - PROBLEM SELECTOR PLAN 4
-  - cont lasix 20 mg IVP daily  - monitor I/O and daily weights.  - Echo shows severely reduced LV systolic function.  - Plan for AICD as noted above.    Jermaine Santos M.D., [facility]  [phone]

## 2017-08-13 NOTE — PROGRESS NOTE ADULT - PROBLEM SELECTOR PLAN 1
Renal function at baseline on admission (as reviewed under patient's alternate MRN to be 1.3-1.6 in 12/2016 and 01/2017), tolerating diuresis  no protein in UA, unable to quantify spot ratio; unlikely diabetic nephropathy  renal sono results noted-- no masses, symmetric kidneys with renal parenchymal disease  Dose meds for eGFR ~35-40  Would tolerate mild worsening of azotemia to optimize volume status  patient advised to avoid NSAID use in light of cardiac and renal history  phos at goal, no need for binders.

## 2017-08-13 NOTE — PROGRESS NOTE ADULT - SUBJECTIVE AND OBJECTIVE BOX
[facility]   [phone]  Dr. Pastor (covering for Dr. Brady)  RENAL FOLLOW UP NOTE  --------------------------------------------------------------------------------  Patient seen and examined.  Denies chest pain / palpitations / SOB.  Abdominal discomfort from yesterday resolved.  Reports good urine output.    PAST HISTORY  --------------------------------------------------------------------------------  No significant changes to PMH, PSH, FHx, SHx, unless otherwise noted    ALLERGIES & MEDICATIONS  --------------------------------------------------------------------------------  Allergies    No Known Allergies    Intolerances      Standing Inpatient Medications  heparin  Injectable 5000 Unit(s) SubCutaneous every 8 hours  aspirin enteric coated 81 milliGRAM(s) Oral daily  clopidogrel Tablet 75 milliGRAM(s) Oral at bedtime  gabapentin 200 milliGRAM(s) Oral at bedtime  gabapentin 100 milliGRAM(s) Oral <User Schedule>  insulin glargine Injectable (LANTUS) 6 Unit(s) SubCutaneous every morning  insulin glargine Injectable (LANTUS) 6 Unit(s) SubCutaneous at bedtime  insulin lispro (HumaLOG) corrective regimen sliding scale   SubCutaneous three times a day before meals  insulin lispro (HumaLOG) corrective regimen sliding scale   SubCutaneous at bedtime  dextrose 5%. 1000 milliLiter(s) IV Continuous <Continuous>  dextrose 50% Injectable 12.5 Gram(s) IV Push once  dextrose 50% Injectable 25 Gram(s) IV Push once  dextrose 50% Injectable 25 Gram(s) IV Push once  furosemide   Injectable 20 milliGRAM(s) IV Push daily  isosorbide   mononitrate ER Tablet (IMDUR) 60 milliGRAM(s) Oral daily    PRN Inpatient Medications  dextrose Gel 1 Dose(s) Oral once PRN  glucagon  Injectable 1 milliGRAM(s) IntraMuscular once PRN      REVIEW OF SYSTEMS  --------------------------------------------------------------------------------  Gen: denies fatigue, fevers/chills, + weakness  CVS: denies chest pain/palpitations. persistent LE edema  Resp: denies SOB/Cough  GI: Denies N/V/Abd pain  : Denies dysuria/oliguria/hematuria    All other systems were reviewed and are negative, except as noted.    VITALS/PHYSICAL EXAM  --------------------------------------------------------------------------------  T(C): 36.4 (08-13-17 @ 04:42), Max: 36.4 (08-12-17 @ 22:31)  HR: 88 (08-13-17 @ 04:42) (82 - 88)  BP: 128/75 (08-13-17 @ 04:42) (128/75 - 149/92)  RR: 17 (08-13-17 @ 04:42) (17 - 18)  SpO2: 95% (08-13-17 @ 04:42) (95% - 97%)  Wt(kg): --        08-12-17 @ 07:01  -  08-13-17 @ 07:00  --------------------------------------------------------  IN: 480 mL / OUT: 0 mL / NET: 480 mL      Physical Exam:  	Gen: NAD, frail-appearing  	Pulm: CTA B/L ant/lat fields  	CV: RRR, S1S2; no rub  	Abd: +BS, soft, nontender/nondistended  	: No suprapubic tenderness.  no campbell              Extremity: No cyanosis, stable b/L LE edema  	Neuro: A&O x 3  	Psych: Normal affect and mood      LABS/STUDIES  --------------------------------------------------------------------------------    139  |  102  |  24  ----------------------------<  133      [08-13-17 @ 08:53]  4.5   |  26  |  1.49        Ca     9.6     [08-13-17 @ 08:53]            eGFR if Non African American: 41 mL/min/1.73M2 (08-13 @ 08:53)  eGFR if : 48 mL/min/1.73M2 (08-13 @ 08:53)    Creatinine Trend:  SCr 1.49 [08-13 @ 08:53]  SCr 1.57 [08-12 @ 06:51]  SCr 1.63 [08-11 @ 07:21]  SCr 1.44 [08-10 @ 06:49]  SCr 1.47 [08-09 @ 17:44]              Urinalysis - [08-12-17 @ 07:04]      Color  / Appearance Clear / SG 1.005 / pH 6.5      Gluc Negative / Ketone Negative  / Bili Negative / Urobili Negative       Blood Negative / Protein Negative / Leuk Est Negative / Nitrite Negative      RBC  / WBC 0-2 / Hyaline  / Gran  / Sq Epi  / Non Sq Epi  / Bacteria     Urine Creatinine 38      [08-12-17 @ 09:06]  Urine Protein <4      [08-12-17 @ 09:06]    HbA1c 7.9      [08-10-17 @ 08:05]

## 2017-08-13 NOTE — DOWNTIME INTERRUPTION NOTE - WHICH MANUAL FORMS INITIATED?
See paper chart for clinical documentation and eMar recorded during downtime. See paper chart for clinical documentation and eMar recorded during downtime during my shift. See paper chart for clinical documentation recorded during downtime during my shift.

## 2017-08-13 NOTE — PROGRESS NOTE ADULT - SUBJECTIVE AND OBJECTIVE BOX
Patient is a 89y old  Male who presents with a chief complaint of chest pain SOB (10 Aug 2017 01:47)      SUBJECTIVE / OVERNIGHT EVENTS:    no events overnight pt ambulating in room no cp abd pain sob.  pt feeling a bit better.     Vital Signs Last 24 Hrs  T(C): 36.4 (13 Aug 2017 04:42), Max: 37.1 (12 Aug 2017 12:50)  T(F): 97.6 (13 Aug 2017 04:42), Max: 98.8 (12 Aug 2017 12:50)  HR: 88 (13 Aug 2017 04:42) (80 - 88)  BP: 128/75 (13 Aug 2017 04:42) (126/77 - 149/92)  BP(mean): --  RR: 17 (13 Aug 2017 04:42) (17 - 18)  SpO2: 95% (13 Aug 2017 04:42) (95% - 97%)  I&O's Summary    12 Aug 2017 07:01  -  13 Aug 2017 07:00  --------------------------------------------------------  IN: 480 mL / OUT: 0 mL / NET: 480 mL            LABS:        139  |  102  |  24<H>  ----------------------------<  133<H>  4.5   |  26  |  1.49<H>    Ca    9.6      13 Aug 2017 08:53        CAPILLARY BLOOD GLUCOSE  129 (13 Aug 2017 09:00)  158 (12 Aug 2017 22:03)  114 (12 Aug 2017 18:25)  142 (12 Aug 2017 13:00)            Urinalysis Basic - ( 12 Aug 2017 07:04 )    Color: x / Appearance: Clear / S.005 / pH: x  Gluc: x / Ketone: Negative  / Bili: Negative / Urobili: Negative   Blood: x / Protein: Negative / Nitrite: Negative   Leuk Esterase: Negative / RBC: x / WBC 0-2 /HPF   Sq Epi: x / Non Sq Epi: x / Bacteria: x        RADIOLOGY & ADDITIONAL TESTS:    Imaging Personally Reviewed:  [x] YES  [ ] NO    Consultant(s) Notes Reviewed:  [x] YES  [ ] NO      MEDICATIONS  (STANDING):  heparin  Injectable 5000 Unit(s) SubCutaneous every 8 hours  aspirin enteric coated 81 milliGRAM(s) Oral daily  clopidogrel Tablet 75 milliGRAM(s) Oral at bedtime  gabapentin 200 milliGRAM(s) Oral at bedtime  gabapentin 100 milliGRAM(s) Oral <User Schedule>  insulin glargine Injectable (LANTUS) 6 Unit(s) SubCutaneous every morning  insulin glargine Injectable (LANTUS) 6 Unit(s) SubCutaneous at bedtime  insulin lispro (HumaLOG) corrective regimen sliding scale   SubCutaneous three times a day before meals  insulin lispro (HumaLOG) corrective regimen sliding scale   SubCutaneous at bedtime  dextrose 5%. 1000 milliLiter(s) (50 mL/Hr) IV Continuous <Continuous>  dextrose 50% Injectable 12.5 Gram(s) IV Push once  dextrose 50% Injectable 25 Gram(s) IV Push once  dextrose 50% Injectable 25 Gram(s) IV Push once  furosemide   Injectable 20 milliGRAM(s) IV Push daily  isosorbide   mononitrate ER Tablet (IMDUR) 60 milliGRAM(s) Oral daily    MEDICATIONS  (PRN):  dextrose Gel 1 Dose(s) Oral once PRN Blood Glucose LESS THAN 70 milliGRAM(s)/deciliter  glucagon  Injectable 1 milliGRAM(s) IntraMuscular once PRN Glucose LESS THAN 70 milligrams/deciliter      Care Discussed with Consultants/Other Providers [x] YES  [ ] NO    HEALTH ISSUES - PROBLEM Dx:  Abdominal pain: Abdominal pain  High degree atrioventricular block: High degree atrioventricular block  Acute on chronic systolic (congestive) heart failure: Acute on chronic systolic (congestive) heart failure  Chronic stable angina: Chronic stable angina  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

## 2017-08-13 NOTE — PROGRESS NOTE ADULT - SUBJECTIVE AND OBJECTIVE BOX
[facility] Cardiology Progress Note  _______________________________    Pt. seen and examined. No new cardiac-related complaints.    Telemetry - sinus , 1st degree AVB, PVC, triplets.    T(C): 36.4 (17 @ 04:42), Max: 37.1 (17 @ 12:50)  HR: 88 (17 @ 04:42) (80 - 88)  BP: 128/75 (17 @ 04:42) (128/75 - 149/92)  RR: 17 (17 @ 04:42) (17 - 18)  SpO2: 95% (17 @ 04:42) (95% - 97%)  I&O's Summary    12 Aug 2017 07:01  -  13 Aug 2017 07:00  --------------------------------------------------------  IN: 480 mL / OUT: 0 mL / NET: 480 mL        PHYSICAL EXAM:  GENERAL: Alert, NAD  NECK: Supple, No JVD, No carotid bruit.  CHEST/LUNG: cta b/l.  HEART: S1 S2 normal, RRR,  No murmurs, rubs, or gallops  ABDOMEN: Soft, Nontender, Nondistended; Bowel sounds present  EXTREMITIES: 1+ Edema.      LABS:        139  |  102  |  24<H>  ----------------------------<  133<H>  4.5   |  26  |  1.49<H>    Ca    9.6      13 Aug 2017 08:53        CARDIAC MARKERS ( 10 Aug 2017 20:35 )  x     / <0.01 ng/mL / 78 U/L / x     / 2.7 ng/mL  CARDIAC MARKERS ( 10 Aug 2017 06:49 )  x     / <0.01 ng/mL / 86 U/L / x     / 3.1 ng/mL  CARDIAC MARKERS ( 10 Aug 2017 00:02 )  x     / <0.01 ng/mL / 71 U/L / x     / 2.9 ng/mL  CARDIAC MARKERS ( 09 Aug 2017 17:44 )  x     / <0.01 ng/mL / 80 U/L / x     / 3.3 ng/mL  CARDIAC MARKERS ( 09 Aug 2017 14:25 )  x     / <0.01 ng/mL / x     / x     / x              Urinalysis Basic - ( 12 Aug 2017 07:04 )    Color: x / Appearance: Clear / S.005 / pH: x  Gluc: x / Ketone: Negative  / Bili: Negative / Urobili: Negative   Blood: x / Protein: Negative / Nitrite: Negative   Leuk Esterase: Negative / RBC: x / WBC 0-2 /HPF   Sq Epi: x / Non Sq Epi: x / Bacteria: x        MEDICATIONS  (STANDING):  heparin  Injectable 5000 Unit(s) SubCutaneous every 8 hours  aspirin enteric coated 81 milliGRAM(s) Oral daily  clopidogrel Tablet 75 milliGRAM(s) Oral at bedtime  gabapentin 200 milliGRAM(s) Oral at bedtime  gabapentin 100 milliGRAM(s) Oral <User Schedule>  insulin glargine Injectable (LANTUS) 6 Unit(s) SubCutaneous every morning  insulin glargine Injectable (LANTUS) 6 Unit(s) SubCutaneous at bedtime  insulin lispro (HumaLOG) corrective regimen sliding scale   SubCutaneous three times a day before meals  insulin lispro (HumaLOG) corrective regimen sliding scale   SubCutaneous at bedtime  dextrose 5%. 1000 milliLiter(s) (50 mL/Hr) IV Continuous <Continuous>  dextrose 50% Injectable 12.5 Gram(s) IV Push once  dextrose 50% Injectable 25 Gram(s) IV Push once  dextrose 50% Injectable 25 Gram(s) IV Push once  furosemide   Injectable 20 milliGRAM(s) IV Push daily  isosorbide   mononitrate ER Tablet (IMDUR) 60 milliGRAM(s) Oral daily    MEDICATIONS  (PRN):  dextrose Gel 1 Dose(s) Oral once PRN Blood Glucose LESS THAN 70 milliGRAM(s)/deciliter  glucagon  Injectable 1 milliGRAM(s) IntraMuscular once PRN Glucose LESS THAN 70 milligrams/deciliter      RADIOLOGY & ADDITIONAL TESTS:

## 2017-08-14 LAB
ANION GAP SERPL CALC-SCNC: 8 MMOL/L — SIGNIFICANT CHANGE UP (ref 5–17)
BUN SERPL-MCNC: 21 MG/DL — SIGNIFICANT CHANGE UP (ref 7–23)
CALCIUM SERPL-MCNC: 9.4 MG/DL — SIGNIFICANT CHANGE UP (ref 8.4–10.5)
CHLORIDE SERPL-SCNC: 102 MMOL/L — SIGNIFICANT CHANGE UP (ref 96–108)
CO2 SERPL-SCNC: 29 MMOL/L — SIGNIFICANT CHANGE UP (ref 22–31)
CREAT SERPL-MCNC: 1.51 MG/DL — HIGH (ref 0.5–1.3)
GLUCOSE SERPL-MCNC: 153 MG/DL — HIGH (ref 70–99)
HCT VFR BLD CALC: 40.5 % — SIGNIFICANT CHANGE UP (ref 39–50)
HGB BLD-MCNC: 12.9 G/DL — LOW (ref 13–17)
MCHC RBC-ENTMCNC: 29.1 PG — SIGNIFICANT CHANGE UP (ref 27–34)
MCHC RBC-ENTMCNC: 31.9 GM/DL — LOW (ref 32–36)
MCV RBC AUTO: 91.2 FL — SIGNIFICANT CHANGE UP (ref 80–100)
PLATELET # BLD AUTO: 214 K/UL — SIGNIFICANT CHANGE UP (ref 150–400)
POTASSIUM SERPL-MCNC: 4.2 MMOL/L — SIGNIFICANT CHANGE UP (ref 3.5–5.3)
POTASSIUM SERPL-SCNC: 4.2 MMOL/L — SIGNIFICANT CHANGE UP (ref 3.5–5.3)
RBC # BLD: 4.45 M/UL — SIGNIFICANT CHANGE UP (ref 4.2–5.8)
RBC # FLD: 14.5 % — SIGNIFICANT CHANGE UP (ref 10.3–14.5)
SODIUM SERPL-SCNC: 139 MMOL/L — SIGNIFICANT CHANGE UP (ref 135–145)
WBC # BLD: 5.3 K/UL — SIGNIFICANT CHANGE UP (ref 3.8–10.5)
WBC # FLD AUTO: 5.3 K/UL — SIGNIFICANT CHANGE UP (ref 3.8–10.5)

## 2017-08-14 PROCEDURE — 33249 INSJ/RPLCMT DEFIB W/LEAD(S): CPT | Mod: Q0

## 2017-08-14 PROCEDURE — 71010: CPT | Mod: 26

## 2017-08-14 PROCEDURE — 93010 ELECTROCARDIOGRAM REPORT: CPT

## 2017-08-14 RX ORDER — POLYETHYLENE GLYCOL 3350 17 G/17G
17 POWDER, FOR SOLUTION ORAL DAILY
Qty: 0 | Refills: 0 | Status: DISCONTINUED | OUTPATIENT
Start: 2017-08-14 | End: 2017-08-17

## 2017-08-14 RX ORDER — FUROSEMIDE 40 MG
20 TABLET ORAL DAILY
Qty: 0 | Refills: 0 | Status: DISCONTINUED | OUTPATIENT
Start: 2017-08-15 | End: 2017-08-16

## 2017-08-14 RX ORDER — SENNA PLUS 8.6 MG/1
2 TABLET ORAL AT BEDTIME
Qty: 0 | Refills: 0 | Status: DISCONTINUED | OUTPATIENT
Start: 2017-08-14 | End: 2017-08-17

## 2017-08-14 RX ORDER — CEFAZOLIN SODIUM 1 G
1 VIAL (EA) INJECTION EVERY 8 HOURS
Qty: 0 | Refills: 0 | Status: COMPLETED | OUTPATIENT
Start: 2017-08-15 | End: 2017-08-15

## 2017-08-14 RX ADMIN — Medication 10 MILLIGRAM(S): at 14:07

## 2017-08-14 RX ADMIN — HEPARIN SODIUM 5000 UNIT(S): 5000 INJECTION INTRAVENOUS; SUBCUTANEOUS at 05:56

## 2017-08-14 RX ADMIN — GABAPENTIN 200 MILLIGRAM(S): 400 CAPSULE ORAL at 22:31

## 2017-08-14 RX ADMIN — SENNA PLUS 2 TABLET(S): 8.6 TABLET ORAL at 22:31

## 2017-08-14 RX ADMIN — HEPARIN SODIUM 5000 UNIT(S): 5000 INJECTION INTRAVENOUS; SUBCUTANEOUS at 14:07

## 2017-08-14 RX ADMIN — INSULIN GLARGINE 6 UNIT(S): 100 INJECTION, SOLUTION SUBCUTANEOUS at 22:34

## 2017-08-14 RX ADMIN — Medication 1: at 22:31

## 2017-08-14 RX ADMIN — GABAPENTIN 100 MILLIGRAM(S): 400 CAPSULE ORAL at 09:56

## 2017-08-14 RX ADMIN — POLYETHYLENE GLYCOL 3350 17 GRAM(S): 17 POWDER, FOR SOLUTION ORAL at 22:31

## 2017-08-14 RX ADMIN — Medication 81 MILLIGRAM(S): at 09:56

## 2017-08-14 RX ADMIN — CLOPIDOGREL BISULFATE 75 MILLIGRAM(S): 75 TABLET, FILM COATED ORAL at 22:31

## 2017-08-14 RX ADMIN — ISOSORBIDE MONONITRATE 60 MILLIGRAM(S): 60 TABLET, EXTENDED RELEASE ORAL at 09:56

## 2017-08-14 RX ADMIN — GABAPENTIN 100 MILLIGRAM(S): 400 CAPSULE ORAL at 14:07

## 2017-08-14 NOTE — PROGRESS NOTE ADULT - PROBLEM SELECTOR PLAN 4
-  - cont lasix 20 mg IVP daily  - monitor I/O and daily weights.  - Echo shows severely reduced LV systolic function.  - Planned for AICD as noted above.    Jermaine Santos M.D., [facility]  [phone] -  - will change lasix to 20 mg po daily.  - monitor I/O and daily weights.  - Echo shows severely reduced LV systolic function.  - Planned for AICD as noted above.    Jermaine Santos M.D., [facility]  [phone]

## 2017-08-14 NOTE — PROGRESS NOTE ADULT - SUBJECTIVE AND OBJECTIVE BOX
[facility] Cardiology Progress Note  _______________________________    Pt. seen and examined. No new cardiac-related complaints.    Telemetry - sinus, PVC, couplets, triplets, bigeminy.    T(C): 36.4 (08-14-17 @ 04:21), Max: 36.7 (08-13-17 @ 13:15)  HR: 79 (08-14-17 @ 04:21) (79 - 80)  BP: 151/95 (08-14-17 @ 04:21) (131/76 - 151/95)  RR: 18 (08-14-17 @ 04:21) (18 - 18)  SpO2: 98% (08-14-17 @ 04:21) (96% - 98%)  I&O's Summary    13 Aug 2017 07:01  -  14 Aug 2017 07:00  --------------------------------------------------------  IN: 700 mL / OUT: 0 mL / NET: 700 mL        PHYSICAL EXAM:  GENERAL: Alert, NAD  NECK: Supple, No JVD, No carotid bruit.  CHEST/LUNG: cta b/l.  HEART: S1 S2 normal, RRR,  No murmurs, rubs, or gallops  ABDOMEN: Soft, Nontender, Nondistended; Bowel sounds present  EXTREMITIES: 1+ Edema.      LABS:                        12.9   5.3   )-----------( 214      ( 14 Aug 2017 06:59 )             40.5     08-14    139  |  102  |  21  ----------------------------<  153<H>  4.2   |  29  |  1.51<H>    Ca    9.4      14 Aug 2017 06:59        CARDIAC MARKERS ( 10 Aug 2017 20:35 )  x     / <0.01 ng/mL / 78 U/L / x     / 2.7 ng/mL  CARDIAC MARKERS ( 10 Aug 2017 06:49 )  x     / <0.01 ng/mL / 86 U/L / x     / 3.1 ng/mL  CARDIAC MARKERS ( 10 Aug 2017 00:02 )  x     / <0.01 ng/mL / 71 U/L / x     / 2.9 ng/mL  CARDIAC MARKERS ( 09 Aug 2017 17:44 )  x     / <0.01 ng/mL / 80 U/L / x     / 3.3 ng/mL  CARDIAC MARKERS ( 09 Aug 2017 14:25 )  x     / <0.01 ng/mL / x     / x     / x          MEDICATIONS  (STANDING):  heparin  Injectable 5000 Unit(s) SubCutaneous every 8 hours  aspirin enteric coated 81 milliGRAM(s) Oral daily  clopidogrel Tablet 75 milliGRAM(s) Oral at bedtime  gabapentin 200 milliGRAM(s) Oral at bedtime  gabapentin 100 milliGRAM(s) Oral <User Schedule>  insulin glargine Injectable (LANTUS) 6 Unit(s) SubCutaneous every morning  insulin glargine Injectable (LANTUS) 6 Unit(s) SubCutaneous at bedtime  insulin lispro (HumaLOG) corrective regimen sliding scale   SubCutaneous three times a day before meals  insulin lispro (HumaLOG) corrective regimen sliding scale   SubCutaneous at bedtime  dextrose 5%. 1000 milliLiter(s) (50 mL/Hr) IV Continuous <Continuous>  dextrose 50% Injectable 12.5 Gram(s) IV Push once  dextrose 50% Injectable 25 Gram(s) IV Push once  dextrose 50% Injectable 25 Gram(s) IV Push once  furosemide   Injectable 20 milliGRAM(s) IV Push daily  isosorbide   mononitrate ER Tablet (IMDUR) 60 milliGRAM(s) Oral daily    MEDICATIONS  (PRN):  dextrose Gel 1 Dose(s) Oral once PRN Blood Glucose LESS THAN 70 milliGRAM(s)/deciliter  glucagon  Injectable 1 milliGRAM(s) IntraMuscular once PRN Glucose LESS THAN 70 milligrams/deciliter      RADIOLOGY & ADDITIONAL TESTS:

## 2017-08-14 NOTE — PROGRESS NOTE ADULT - CARDIOVASCULAR
detailed exam
Regular rate & rhythm, normal S1, S2; no murmurs, gallops or rubs; no S3, S4

## 2017-08-14 NOTE — PROGRESS NOTE ADULT - NS NEC GEN PE MLT EXAM PC
No bruits; no thyromegaly or nodules

## 2017-08-14 NOTE — PROGRESS NOTE ADULT - SUBJECTIVE AND OBJECTIVE BOX
Date of Admission: 8/9/17     24H hour events:  No acute events overnight. reports feeling well this am, No CP, palpitations, lightheadedness or SOB.     MEDICATIONS:  heparin  Injectable 5000 Unit(s) SubCutaneous every 8 hours  aspirin enteric coated 81 milliGRAM(s) Oral daily  clopidogrel Tablet 75 milliGRAM(s) Oral at bedtime  isosorbide   mononitrate ER Tablet (IMDUR) 60 milliGRAM(s) Oral daily        gabapentin 200 milliGRAM(s) Oral at bedtime  gabapentin 100 milliGRAM(s) Oral <User Schedule>      insulin glargine Injectable (LANTUS) 6 Unit(s) SubCutaneous every morning  insulin glargine Injectable (LANTUS) 6 Unit(s) SubCutaneous at bedtime  insulin lispro (HumaLOG) corrective regimen sliding scale   SubCutaneous three times a day before meals  insulin lispro (HumaLOG) corrective regimen sliding scale   SubCutaneous at bedtime  dextrose Gel 1 Dose(s) Oral once PRN  dextrose 50% Injectable 12.5 Gram(s) IV Push once  dextrose 50% Injectable 25 Gram(s) IV Push once  dextrose 50% Injectable 25 Gram(s) IV Push once  glucagon  Injectable 1 milliGRAM(s) IntraMuscular once PRN    dextrose 5%. 1000 milliLiter(s) IV Continuous <Continuous>      REVIEW OF SYSTEMS:  Complete 10point ROS negative.    PHYSICAL EXAM:  T(C): 36.4 (08-14-17 @ 04:21), Max: 36.7 (08-13-17 @ 13:15)  HR: 79 (08-14-17 @ 04:21) (79 - 80)  BP: 151/95 (08-14-17 @ 04:21) (131/76 - 151/95)  RR: 18 (08-14-17 @ 04:21) (18 - 18)  SpO2: 98% (08-14-17 @ 04:21) (96% - 98%)  Wt(kg): --  I&O's Summary    13 Aug 2017 07:01  -  14 Aug 2017 07:00  --------------------------------------------------------  IN: 700 mL / OUT: 0 mL / NET: 700 mL        Appearance: Normal, pleasant, resting comfortably in bed  HEENT:   Normal oral mucosa, OP clear 	  Cardiovascular: Normal S1 S2, No JVD, No murmurs, No edema  Respiratory: Lungs clear to auscultation	  Psychiatry: Mood & affect appropriate  Gastrointestinal:  Soft, Non-tender, + BS	  Skin: No rashes, No ecchymoses, No cyanosis	  Neurologic: Non-focal  Extremities: Normal range of motion, No clubbing, cyanosis or edema  Vascular: Peripheral pulses palpable 2+ bilaterally      LABS:	 	    CBC Full  -  ( 14 Aug 2017 06:59 )  WBC Count : 5.3 K/uL  Hemoglobin : 12.9 g/dL  Hematocrit : 40.5 %  Platelet Count - Automated : 214 K/uL  Mean Cell Volume : 91.2 fl  Mean Cell Hemoglobin : 29.1 pg  Mean Cell Hemoglobin Concentration : 31.9 gm/dL      08-14    139  |  102  |  21  ----------------------------<  153<H>  4.2   |  29  |  1.51<H>  08-13    139  |  102  |  24<H>  ----------------------------<  133<H>  4.5   |  26  |  1.49<H>    Ca    9.4      14 Aug 2017 06:59  Ca    9.6      13 Aug 2017 08:53        proBNP: Serum Pro-Brain Natriuretic Peptide: 5796 pg/mL (08-09 @ 14:25)      TELEMETRY:  sinus 70-100s; PVCs	      	  ASSESSMENT/PLAN: 	  88 yo gentleman w/ hx of DM, CAD s/p CABG (2005), ICMO (EF 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     #High grade AV block-- consented and plan for AICD placement today  - Case d/w Dr. Askew  [id number]

## 2017-08-14 NOTE — PROGRESS NOTE ADULT - SUBJECTIVE AND OBJECTIVE BOX
Patient is a 89y old  Male who presents with a chief complaint of chest pain SOB (10 Aug 2017 01:47)      SUBJECTIVE / OVERNIGHT EVENTS:    pt conversive in good spirts no events overnight pt feeling better. no cp sob abd pain     Vital Signs Last 24 Hrs  T(C): 36.4 (14 Aug 2017 04:21), Max: 36.7 (13 Aug 2017 13:15)  T(F): 97.6 (14 Aug 2017 04:21), Max: 98 (13 Aug 2017 13:15)  HR: 79 (14 Aug 2017 04:21) (79 - 80)  BP: 151/95 (14 Aug 2017 04:21) (131/76 - 151/95)  BP(mean): --  RR: 18 (14 Aug 2017 04:21) (18 - 18)  SpO2: 98% (14 Aug 2017 04:21) (96% - 98%)  I&O's Summary    13 Aug 2017 07:01  -  14 Aug 2017 07:00  --------------------------------------------------------  IN: 700 mL / OUT: 0 mL / NET: 700 mL            LABS:                        12.9   5.3   )-----------( 214      ( 14 Aug 2017 06:59 )             40.5     08-14    139  |  102  |  21  ----------------------------<  153<H>  4.2   |  29  |  1.51<H>    Ca    9.4      14 Aug 2017 06:59        CAPILLARY BLOOD GLUCOSE  137 (14 Aug 2017 09:25)  115 (13 Aug 2017 21:22)  159 (13 Aug 2017 17:53)  123 (13 Aug 2017 13:15)                RADIOLOGY & ADDITIONAL TESTS:    Imaging Personally Reviewed:  [x] YES  [ ] NO    Consultant(s) Notes Reviewed:  [x] YES  [ ] NO      MEDICATIONS  (STANDING):  heparin  Injectable 5000 Unit(s) SubCutaneous every 8 hours  aspirin enteric coated 81 milliGRAM(s) Oral daily  clopidogrel Tablet 75 milliGRAM(s) Oral at bedtime  gabapentin 200 milliGRAM(s) Oral at bedtime  gabapentin 100 milliGRAM(s) Oral <User Schedule>  insulin glargine Injectable (LANTUS) 6 Unit(s) SubCutaneous every morning  insulin glargine Injectable (LANTUS) 6 Unit(s) SubCutaneous at bedtime  insulin lispro (HumaLOG) corrective regimen sliding scale   SubCutaneous three times a day before meals  insulin lispro (HumaLOG) corrective regimen sliding scale   SubCutaneous at bedtime  dextrose 5%. 1000 milliLiter(s) (50 mL/Hr) IV Continuous <Continuous>  dextrose 50% Injectable 12.5 Gram(s) IV Push once  dextrose 50% Injectable 25 Gram(s) IV Push once  dextrose 50% Injectable 25 Gram(s) IV Push once  isosorbide   mononitrate ER Tablet (IMDUR) 60 milliGRAM(s) Oral daily    MEDICATIONS  (PRN):  dextrose Gel 1 Dose(s) Oral once PRN Blood Glucose LESS THAN 70 milliGRAM(s)/deciliter  glucagon  Injectable 1 milliGRAM(s) IntraMuscular once PRN Glucose LESS THAN 70 milligrams/deciliter      Care Discussed with Consultants/Other Providers [x] YES  [ ] NO    HEALTH ISSUES - PROBLEM Dx:  Abdominal pain: Abdominal pain  High degree atrioventricular block: High degree atrioventricular block  Acute on chronic systolic (congestive) heart failure: Acute on chronic systolic (congestive) heart failure  Chronic stable angina: Chronic stable angina  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

## 2017-08-14 NOTE — PROGRESS NOTE ADULT - SUBJECTIVE AND OBJECTIVE BOX
[facility]   [phone]  RENAL FOLLOW UP NOTE  --------------------------------------------------------------------------------  Chief Complaint:    24 hour events/subjective:    states urinating much better in past 24 hr      PAST HISTORY  --------------------------------------------------------------------------------  No significant changes to PMH, PSH, FHx, SHx, unless otherwise noted    ALLERGIES & MEDICATIONS  --------------------------------------------------------------------------------  Allergies    No Known Allergies    Intolerances      Standing Inpatient Medications  heparin  Injectable 5000 Unit(s) SubCutaneous every 8 hours  aspirin enteric coated 81 milliGRAM(s) Oral daily  clopidogrel Tablet 75 milliGRAM(s) Oral at bedtime  gabapentin 200 milliGRAM(s) Oral at bedtime  gabapentin 100 milliGRAM(s) Oral <User Schedule>  insulin glargine Injectable (LANTUS) 6 Unit(s) SubCutaneous every morning  insulin glargine Injectable (LANTUS) 6 Unit(s) SubCutaneous at bedtime  insulin lispro (HumaLOG) corrective regimen sliding scale   SubCutaneous three times a day before meals  insulin lispro (HumaLOG) corrective regimen sliding scale   SubCutaneous at bedtime  dextrose 5%. 1000 milliLiter(s) IV Continuous <Continuous>  dextrose 50% Injectable 12.5 Gram(s) IV Push once  dextrose 50% Injectable 25 Gram(s) IV Push once  dextrose 50% Injectable 25 Gram(s) IV Push once  isosorbide   mononitrate ER Tablet (IMDUR) 60 milliGRAM(s) Oral daily    PRN Inpatient Medications  dextrose Gel 1 Dose(s) Oral once PRN  glucagon  Injectable 1 milliGRAM(s) IntraMuscular once PRN      REVIEW OF SYSTEMS  --------------------------------------------------------------------------------    Gen: denies fatigue, fevers/chills,  CVS: denies chest pain/palpitations  Resp: denies SOB/Cough  GI: Denies N/V/Abd pain  : Denies dysuria/oliguria/hematuria    All other systems were reviewed and are negative, except as noted.    VITALS/PHYSICAL EXAM  --------------------------------------------------------------------------------  T(C): 36.4 (08-14-17 @ 04:21), Max: 36.7 (08-13-17 @ 13:15)  HR: 79 (08-14-17 @ 04:21) (79 - 80)  BP: 151/95 (08-14-17 @ 04:21) (131/76 - 151/95)  RR: 18 (08-14-17 @ 04:21) (18 - 18)  SpO2: 98% (08-14-17 @ 04:21) (96% - 98%)  Wt(kg): --        08-13-17 @ 07:01  -  08-14-17 @ 07:00  --------------------------------------------------------  IN: 700 mL / OUT: 0 mL / NET: 700 mL      Physical Exam:  	    Physical Exam:  	Gen: alert oriented place person and date   	Pulm: Decreased breath sounds b/l bases. no rales or ronchi or wheezing  	CV: RRR, S1/S2. no rub  	Back: No CVA tenderness; no sacral edema  	Abd: +BS, soft, nontender/nondistended  	: No suprapubic tenderness.               Extremity: No cyanosis, no edema no clubbing  	  LABS/STUDIES  --------------------------------------------------------------------------------              12.9   5.3   >-----------<  214      [08-14-17 @ 06:59]              40.5     139  |  102  |  21  ----------------------------<  153      [08-14-17 @ 06:59]  4.2   |  29  |  1.51        Ca     9.4     [08-14-17 @ 06:59]            Creatinine Trend:  SCr 1.51 [08-14 @ 06:59]  SCr 1.49 [08-13 @ 08:53]  SCr 1.57 [08-12 @ 06:51]  SCr 1.63 [08-11 @ 07:21]  SCr 1.44 [08-10 @ 06:49]              Urinalysis - [08-12-17 @ 07:04]      Color  / Appearance Clear / SG 1.005 / pH 6.5      Gluc Negative / Ketone Negative  / Bili Negative / Urobili Negative       Blood Negative / Protein Negative / Leuk Est Negative / Nitrite Negative      RBC  / WBC 0-2 / Hyaline  / Gran  / Sq Epi  / Non Sq Epi  / Bacteria     Urine Creatinine 38      [08-12-17 @ 09:06]  Urine Protein <4      [08-12-17 @ 09:06]    HbA1c 7.9      [08-10-17 @ 08:05]

## 2017-08-15 ENCOUNTER — TRANSCRIPTION ENCOUNTER (OUTPATIENT)
Age: 82
End: 2017-08-15

## 2017-08-15 PROBLEM — Z00.00 ENCOUNTER FOR PREVENTIVE HEALTH EXAMINATION: Noted: 2017-08-15

## 2017-08-15 LAB
ANION GAP SERPL CALC-SCNC: 12 MMOL/L — SIGNIFICANT CHANGE UP (ref 5–17)
BASOPHILS # BLD AUTO: 0 K/UL — SIGNIFICANT CHANGE UP (ref 0–0.2)
BASOPHILS NFR BLD AUTO: 0 % — SIGNIFICANT CHANGE UP (ref 0–2)
BUN SERPL-MCNC: 21 MG/DL — SIGNIFICANT CHANGE UP (ref 7–23)
CALCIUM SERPL-MCNC: 9.1 MG/DL — SIGNIFICANT CHANGE UP (ref 8.4–10.5)
CHLORIDE SERPL-SCNC: 102 MMOL/L — SIGNIFICANT CHANGE UP (ref 96–108)
CO2 SERPL-SCNC: 24 MMOL/L — SIGNIFICANT CHANGE UP (ref 22–31)
CREAT SERPL-MCNC: 1.43 MG/DL — HIGH (ref 0.5–1.3)
EOSINOPHIL # BLD AUTO: 0.1 K/UL — SIGNIFICANT CHANGE UP (ref 0–0.5)
EOSINOPHIL NFR BLD AUTO: 1.9 % — SIGNIFICANT CHANGE UP (ref 0–6)
GLUCOSE SERPL-MCNC: 191 MG/DL — HIGH (ref 70–99)
HCT VFR BLD CALC: 36.5 % — LOW (ref 39–50)
HGB BLD-MCNC: 11.8 G/DL — LOW (ref 13–17)
LYMPHOCYTES # BLD AUTO: 1.2 K/UL — SIGNIFICANT CHANGE UP (ref 1–3.3)
LYMPHOCYTES # BLD AUTO: 19 % — SIGNIFICANT CHANGE UP (ref 13–44)
MCHC RBC-ENTMCNC: 29.5 PG — SIGNIFICANT CHANGE UP (ref 27–34)
MCHC RBC-ENTMCNC: 32.3 GM/DL — SIGNIFICANT CHANGE UP (ref 32–36)
MCV RBC AUTO: 91.2 FL — SIGNIFICANT CHANGE UP (ref 80–100)
MONOCYTES # BLD AUTO: 0.6 K/UL — SIGNIFICANT CHANGE UP (ref 0–0.9)
MONOCYTES NFR BLD AUTO: 9.8 % — SIGNIFICANT CHANGE UP (ref 2–14)
NEUTROPHILS # BLD AUTO: 4.3 K/UL — SIGNIFICANT CHANGE UP (ref 1.8–7.4)
NEUTROPHILS NFR BLD AUTO: 69.3 % — SIGNIFICANT CHANGE UP (ref 43–77)
PLATELET # BLD AUTO: 196 K/UL — SIGNIFICANT CHANGE UP (ref 150–400)
POTASSIUM SERPL-MCNC: 4.9 MMOL/L — SIGNIFICANT CHANGE UP (ref 3.5–5.3)
POTASSIUM SERPL-SCNC: 4.9 MMOL/L — SIGNIFICANT CHANGE UP (ref 3.5–5.3)
RBC # BLD: 4 M/UL — LOW (ref 4.2–5.8)
RBC # FLD: 14.5 % — SIGNIFICANT CHANGE UP (ref 10.3–14.5)
SODIUM SERPL-SCNC: 138 MMOL/L — SIGNIFICANT CHANGE UP (ref 135–145)
WBC # BLD: 6.2 K/UL — SIGNIFICANT CHANGE UP (ref 3.8–10.5)
WBC # FLD AUTO: 6.2 K/UL — SIGNIFICANT CHANGE UP (ref 3.8–10.5)

## 2017-08-15 RX ORDER — CARVEDILOL PHOSPHATE 80 MG/1
6.25 CAPSULE, EXTENDED RELEASE ORAL EVERY 12 HOURS
Qty: 0 | Refills: 0 | Status: DISCONTINUED | OUTPATIENT
Start: 2017-08-15 | End: 2017-08-17

## 2017-08-15 RX ADMIN — Medication 100 MILLIGRAM(S): at 08:52

## 2017-08-15 RX ADMIN — Medication 100 MILLIGRAM(S): at 01:07

## 2017-08-15 RX ADMIN — INSULIN GLARGINE 6 UNIT(S): 100 INJECTION, SOLUTION SUBCUTANEOUS at 09:21

## 2017-08-15 RX ADMIN — Medication 1: at 09:10

## 2017-08-15 RX ADMIN — Medication 81 MILLIGRAM(S): at 12:33

## 2017-08-15 RX ADMIN — GABAPENTIN 200 MILLIGRAM(S): 400 CAPSULE ORAL at 21:10

## 2017-08-15 RX ADMIN — POLYETHYLENE GLYCOL 3350 17 GRAM(S): 17 POWDER, FOR SOLUTION ORAL at 17:41

## 2017-08-15 RX ADMIN — ISOSORBIDE MONONITRATE 60 MILLIGRAM(S): 60 TABLET, EXTENDED RELEASE ORAL at 12:33

## 2017-08-15 RX ADMIN — Medication 100 MILLIGRAM(S): at 17:40

## 2017-08-15 RX ADMIN — INSULIN GLARGINE 6 UNIT(S): 100 INJECTION, SOLUTION SUBCUTANEOUS at 21:29

## 2017-08-15 RX ADMIN — GABAPENTIN 100 MILLIGRAM(S): 400 CAPSULE ORAL at 14:09

## 2017-08-15 RX ADMIN — Medication 1: at 18:39

## 2017-08-15 RX ADMIN — Medication 2: at 21:09

## 2017-08-15 RX ADMIN — HEPARIN SODIUM 5000 UNIT(S): 5000 INJECTION INTRAVENOUS; SUBCUTANEOUS at 14:05

## 2017-08-15 RX ADMIN — CLOPIDOGREL BISULFATE 75 MILLIGRAM(S): 75 TABLET, FILM COATED ORAL at 21:10

## 2017-08-15 RX ADMIN — HEPARIN SODIUM 5000 UNIT(S): 5000 INJECTION INTRAVENOUS; SUBCUTANEOUS at 21:09

## 2017-08-15 RX ADMIN — GABAPENTIN 100 MILLIGRAM(S): 400 CAPSULE ORAL at 08:49

## 2017-08-15 RX ADMIN — SENNA PLUS 2 TABLET(S): 8.6 TABLET ORAL at 21:10

## 2017-08-15 RX ADMIN — Medication 20 MILLIGRAM(S): at 05:20

## 2017-08-15 RX ADMIN — Medication 2: at 14:05

## 2017-08-15 RX ADMIN — CARVEDILOL PHOSPHATE 6.25 MILLIGRAM(S): 80 CAPSULE, EXTENDED RELEASE ORAL at 17:48

## 2017-08-15 NOTE — DISCHARGE NOTE ADULT - CARE PROVIDERS DIRECT ADDRESSES
[email or web address],DirectAddress_Unknown

## 2017-08-15 NOTE — DISCHARGE NOTE ADULT - PROVIDER TOKENS
TOKEN:'[id number]',TOKEN:'[id number]' TOKEN:'[id number]',TOKEN:'[id number]',TOKEN:'[id number]',TOKEN:'[id number]'

## 2017-08-15 NOTE — DISCHARGE NOTE ADULT - MEDICATION SUMMARY - MEDICATIONS TO STOP TAKING
I will STOP taking the medications listed below when I get home from the hospital:  None I will STOP taking the medications listed below when I get home from the hospital:    Onglyza 2.5 mg oral tablet  -- 1 tab(s) by mouth once a day    Prandin 1 mg oral tablet  -- 1 tab(s) by mouth 3 times a day (before meals)

## 2017-08-15 NOTE — PROGRESS NOTE ADULT - PROBLEM SELECTOR PLAN 1
s/p ICD implant 8/14/17  ICD teachings done with patient  Ancef 1 gm IVSS post op for 3 doses  ID, booklet, discharge instructions given  Follow up wound check on 9/7/17 at 9:50am  Patient clear for discharge from EP perspective after last dose of Ancef

## 2017-08-15 NOTE — PROGRESS NOTE ADULT - PROBLEM SELECTOR PLAN 4
-  - cont lasix 20 mg po daily.   - DC planning.    Jermaine Santos M.D., [facility]  [phone] -  - cont lasix 20 mg po daily.   - DC planning.  - see me in office 1 week after discharge.  Jermaine Santos M.D., [facility]  [phone]

## 2017-08-15 NOTE — DISCHARGE NOTE ADULT - MEDICATION SUMMARY - MEDICATIONS TO TAKE
I will START or STAY ON the medications listed below when I get home from the hospital:    Aspirin Enteric Coated 81 mg oral delayed release tablet  -- 1 tab(s) by mouth once a day  -- Indication: For CAD (coronary artery disease)    isosorbide mononitrate 60 mg oral tablet, extended release  -- 1 tab(s) by mouth once a day  -- Indication: For Chronic stable angina    Neurontin 100 mg oral capsule  -- 1 tab(s) by mouth 2 times a day 8 AM and 2 PM  -- Indication: For Pain    Neurontin 100 mg oral capsule  -- 2 tab(s) by mouth once a day (at bedtime) at 8 pm  -- Indication: For Pain    Lantus 100 units/mL subcutaneous solution  -- 6 unit(s) subcutaneous 2 times a day qAM and qHS  -- Indication: For DM type 2 (diabetes mellitus, type 2)    Onglyza 2.5 mg oral tablet  -- 1 tab(s) by mouth once a day  -- Indication: For DM type 2 (diabetes mellitus, type 2)    Prandin 1 mg oral tablet  -- 1 tab(s) by mouth 3 times a day (before meals)  -- Indication: For DM type 2 (diabetes mellitus, type 2)    Lipitor 20 mg oral tablet  -- 1 tab(s) by mouth once a day  -- Indication: For HLD (hyperlipidemia)    Plavix 75 mg oral tablet  -- 1 tab(s) by mouth once a day (at bedtime)  -- Indication: For S/P CABG (coronary artery bypass graft)    Coreg 6.25 mg oral tablet  -- 1 tab(s) by mouth 2 times a day  -- Indication: For Acute on chronic systolic (congestive) heart failure    furosemide 20 mg oral tablet  -- 1 tab(s) by mouth once a day  -- Indication: For Acute on chronic systolic (congestive) heart failure I will START or STAY ON the medications listed below when I get home from the hospital:    Aspirin Enteric Coated 81 mg oral delayed release tablet  -- 1 tab(s) by mouth once a day  -- Indication: For CAD (coronary artery disease)    isosorbide mononitrate 60 mg oral tablet, extended release  -- 1 tab(s) by mouth once a day  -- Indication: For CAD (coronary artery disease)    Neurontin 100 mg oral capsule  -- 1 tab(s) by mouth 2 times a day 8 AM and 2 PM  -- Indication: For Pain    Neurontin 100 mg oral capsule  -- 2 tab(s) by mouth once a day (at bedtime) at 8 pm  -- Indication: For Pain    Lantus 100 units/mL subcutaneous solution  -- 6 unit(s) subcutaneous 2 times a day qAM and qHS  -- Indication: For DM type 2 (diabetes mellitus, type 2)    insulin lispro 100 units/mL subcutaneous solution  --  subcutaneous once a day (at bedtime); 0 Unit(s) if Glucose 0 - 250  1 Unit(s) if Glucose 251 - 300  2 Unit(s) if Glucose 301 - 350  3 Unit(s) if Glucose 351 - 400  4 Unit(s) if Glucose Greater Than 400  -- Indication: For DM type 2 (diabetes mellitus, type 2)    insulin lispro 100 units/mL subcutaneous solution  --  subcutaneous 3 times a day (before meals); 1 Unit(s) if Glucose 151 - 200  2 Unit(s) if Glucose 201 - 250  3 Unit(s) if Glucose 251 - 300  4 Unit(s) if Glucose 301 - 350  5 Unit(s) if Glucose 351 - 400  6 Unit(s) if Glucose Greater Than 400  -- Indication: For DM type 2 (diabetes mellitus, type 2)    Lipitor 20 mg oral tablet  -- 1 tab(s) by mouth once a day  -- Indication: For HLD (hyperlipidemia)    Plavix 75 mg oral tablet  -- 1 tab(s) by mouth once a day (at bedtime)  -- Indication: For S/P CABG (coronary artery bypass graft)    Coreg 6.25 mg oral tablet  -- 1 tab(s) by mouth 2 times a day  -- Indication: For Acute on chronic systolic (congestive) heart failure    furosemide 40 mg oral tablet  -- 1 tab(s) by mouth once a day  -- Indication: For Hf

## 2017-08-15 NOTE — PROGRESS NOTE ADULT - SUBJECTIVE AND OBJECTIVE BOX
Patient is a 89y old  Male who presents with a chief complaint of chest pain SOB (15 Aug 2017 11:00)      SUBJECTIVE / OVERNIGHT EVENTS:    pt feeling well procedure well tolerated no events overnight.  no cp abd pain n/v/d fever chills     Vital Signs Last 24 Hrs  T(C): 36.8 (15 Aug 2017 12:15), Max: 36.9 (15 Aug 2017 04:11)  T(F): 98.2 (15 Aug 2017 12:15), Max: 98.4 (15 Aug 2017 04:11)  HR: 93 (15 Aug 2017 12:15) (90 - 93)  BP: 126/75 (15 Aug 2017 12:15) (124/72 - 140/85)  BP(mean): --  RR: 18 (15 Aug 2017 12:15) (18 - 20)  SpO2: 99% (15 Aug 2017 12:15) (96% - 99%)  I&O's Summary    14 Aug 2017 07:01  -  15 Aug 2017 07:00  --------------------------------------------------------  IN: 150 mL / OUT: 750 mL / NET: -600 mL    15 Aug 2017 07:01  -  15 Aug 2017 12:50  --------------------------------------------------------  IN: 0 mL / OUT: 430 mL / NET: -430 mL            LABS:                        11.8   6.2   )-----------( 196      ( 15 Aug 2017 10:56 )             36.5     08-15    138  |  102  |  21  ----------------------------<  191<H>  4.9   |  24  |  1.43<H>    Ca    9.1      15 Aug 2017 06:37        CAPILLARY BLOOD GLUCOSE  212 (15 Aug 2017 12:15)  165 (15 Aug 2017 08:47)  260 (14 Aug 2017 21:41)  141 (14 Aug 2017 19:29)  148 (14 Aug 2017 13:17)                RADIOLOGY & ADDITIONAL TESTS:    Imaging Personally Reviewed:  [x] YES  [ ] NO    Consultant(s) Notes Reviewed:  [x] YES  [ ] NO      MEDICATIONS  (STANDING):  heparin  Injectable 5000 Unit(s) SubCutaneous every 8 hours  aspirin enteric coated 81 milliGRAM(s) Oral daily  clopidogrel Tablet 75 milliGRAM(s) Oral at bedtime  gabapentin 200 milliGRAM(s) Oral at bedtime  gabapentin 100 milliGRAM(s) Oral <User Schedule>  insulin glargine Injectable (LANTUS) 6 Unit(s) SubCutaneous every morning  insulin glargine Injectable (LANTUS) 6 Unit(s) SubCutaneous at bedtime  insulin lispro (HumaLOG) corrective regimen sliding scale   SubCutaneous three times a day before meals  insulin lispro (HumaLOG) corrective regimen sliding scale   SubCutaneous at bedtime  dextrose 5%. 1000 milliLiter(s) (50 mL/Hr) IV Continuous <Continuous>  dextrose 50% Injectable 12.5 Gram(s) IV Push once  dextrose 50% Injectable 25 Gram(s) IV Push once  dextrose 50% Injectable 25 Gram(s) IV Push once  isosorbide   mononitrate ER Tablet (IMDUR) 60 milliGRAM(s) Oral daily  furosemide    Tablet 20 milliGRAM(s) Oral daily  polyethylene glycol 3350 17 Gram(s) Oral daily  senna 2 Tablet(s) Oral at bedtime  ceFAZolin   IVPB 1 milliGRAM(s) IV Intermittent every 8 hours  carvedilol 6.25 milliGRAM(s) Oral every 12 hours    MEDICATIONS  (PRN):  dextrose Gel 1 Dose(s) Oral once PRN Blood Glucose LESS THAN 70 milliGRAM(s)/deciliter  glucagon  Injectable 1 milliGRAM(s) IntraMuscular once PRN Glucose LESS THAN 70 milligrams/deciliter      Care Discussed with Consultants/Other Providers [x] YES  [ ] NO    HEALTH ISSUES - PROBLEM Dx:  Abdominal pain: Abdominal pain  High degree atrioventricular block: High degree atrioventricular block  Acute on chronic systolic (congestive) heart failure: Acute on chronic systolic (congestive) heart failure  Chronic stable angina: Chronic stable angina  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

## 2017-08-15 NOTE — PROGRESS NOTE ADULT - SUBJECTIVE AND OBJECTIVE BOX
INTERVAL HPI/OVERNIGHT EVENTS:  Resting comfortably in bed, denies any sob, dizziness, or palpitations; (+) tenderness at ICD site    MEDICATIONS  (STANDING):  heparin  Injectable 5000 Unit(s) SubCutaneous every 8 hours  aspirin enteric coated 81 milliGRAM(s) Oral daily  clopidogrel Tablet 75 milliGRAM(s) Oral at bedtime  gabapentin 200 milliGRAM(s) Oral at bedtime  gabapentin 100 milliGRAM(s) Oral <User Schedule>  insulin glargine Injectable (LANTUS) 6 Unit(s) SubCutaneous every morning  insulin glargine Injectable (LANTUS) 6 Unit(s) SubCutaneous at bedtime  insulin lispro (HumaLOG) corrective regimen sliding scale   SubCutaneous three times a day before meals  insulin lispro (HumaLOG) corrective regimen sliding scale   SubCutaneous at bedtime  dextrose 5%. 1000 milliLiter(s) (50 mL/Hr) IV Continuous <Continuous>  dextrose 50% Injectable 12.5 Gram(s) IV Push once  dextrose 50% Injectable 25 Gram(s) IV Push once  dextrose 50% Injectable 25 Gram(s) IV Push once  isosorbide   mononitrate ER Tablet (IMDUR) 60 milliGRAM(s) Oral daily  furosemide    Tablet 20 milliGRAM(s) Oral daily  polyethylene glycol 3350 17 Gram(s) Oral daily  senna 2 Tablet(s) Oral at bedtime  ceFAZolin   IVPB 1 milliGRAM(s) IV Intermittent every 8 hours  carvedilol 6.25 milliGRAM(s) Oral every 12 hours    MEDICATIONS  (PRN):  dextrose Gel 1 Dose(s) Oral once PRN Blood Glucose LESS THAN 70 milliGRAM(s)/deciliter  glucagon  Injectable 1 milliGRAM(s) IntraMuscular once PRN Glucose LESS THAN 70 milligrams/deciliter    Allergies:  No Known Allergies    Vital Signs Last 24 Hrs  T(C): 36.8 (15 Aug 2017 12:15), Max: 36.9 (15 Aug 2017 04:11)  T(F): 98.2 (15 Aug 2017 12:15), Max: 98.4 (15 Aug 2017 04:11)  HR: 93 (15 Aug 2017 12:15) (90 - 93)  BP: 126/75 (15 Aug 2017 12:15) (124/72 - 140/85)  BP(mean): --  RR: 18 (15 Aug 2017 12:15) (18 - 20)  SpO2: 99% (15 Aug 2017 12:15) (96% - 99%)    Physical Exam:  Vital Signs : /75    HR93   RR18    Constitutional: well developed, well nourished, and no acute distress  Neurological: Alert & Oriented x 3, TAMAYO  HEENT: PERRLA, Neck supple.  Respiratory: CTA B/L  Cardiovascular: (+) S1 & S2, RRR  Gastrointestinal: soft, NT, nondistended, (+) BS  Genitourinary: non distended bladder, voiding freely  Extremities: No clubbing, No cyanosis  Skin:  normal skin color and pigmentation, no skin lesions    LABS:                        11.8   6.2   )-----------( 196      ( 15 Aug 2017 10:56 )             36.5     08-15    138  |  102  |  21  ----------------------------<  191<H>  4.9   |  24  |  1.43<H>    Ca    9.1      15 Aug 2017 06:37            RADIOLOGY & ADDITIONAL TESTS:    TELE: NSR at 's, PVC, 4 beats WCT    CXR: no pneumothorax and good lead placement

## 2017-08-15 NOTE — DISCHARGE NOTE ADULT - HOME CARE AGENCY
[facility], [phone], start of care the day after discharge, the nurse will call you to schedule visit, physical therapy to follow

## 2017-08-15 NOTE — DISCHARGE NOTE ADULT - ADDITIONAL INSTRUCTIONS
Follow up with Dr Askew (Electrophysiologist) as scheduled on September 7th @ 9:50AM. Call office to confirm appointment.  Follow up with your Primary MD Dr Negron within 1 week. Call office for appointment  Follow up with Dr Santos (Cardiologist) within 2 weeks. Call office for appointment  Follow up with Dr Brady (Nephrologist) within 1 month. Call office for appointment

## 2017-08-15 NOTE — DISCHARGE NOTE ADULT - PLAN OF CARE
[employer] AICD/ PACEMAKER PLACE [employer] MODEL # [id number] placed 8/14/2017 with Dr Askew  MRI compatible only after 6 weeks   DO NOT LIFT LEFT ARM OVER YOUR HEAD FOR 2 WEEKS  KEEP SURGICAL ARE CLEAN AND DRY, DO NOT SHOWER FOR 5 DAYS  Follow instructions given to you in your EP package at bedside   KEEP APPOINTMENT SCHEDULED WITH CARDIOLOGY ON SEPTEMBER 7, 2017 AT 9:50 AM  USE ENTRANCE #1 OF THE HOSPITAL  Return to emergency for difficulty breathing, chest pain, fever, dizziness or worsen plan as above Schedule follow up with your primary care physician and cardiology  continue current medications Continue medications as prescribed  Follow up with your Primary MD within 1 week Continue medications as prescribed  Follow up with your Primary MD within 1 week. Continue medications as prescribed  Follow up with Dr Brady your Nephrologist (kidney doctor) within 1 month [employer] AICD/ PACEMAKER PLACEMENT FOLLOW-UP MEDTRONIC MODEL # [id number] placed 8/14/2017 with Dr Askew  MRI compatible only after 6 weeks   DO NOT LIFT LEFT ARM HIGHER THAN SHOULDER LEVEL FOR 4 WEEKS  KEEP SURGICAL ARE CLEAN AND DRY, DO NOT SHOWER FOR 5 DAYS  NO TUB BATHING FOR 4 WEEKS  Follow instructions given to you in your EP package at bedside   KEEP APPOINTMENT SCHEDULED WITH CARDIOLOGY ON SEPTEMBER 7, 2017 AT 9:50 AM  USE ENTRANCE #1 OF THE HOSPITAL  Return to emergency for difficulty breathing, chest pain, fever, dizziness or worsen

## 2017-08-15 NOTE — DISCHARGE NOTE ADULT - CARE PROVIDER_API CALL
Pascale Askew), Cardiac Electrophysiology; Cardiovascular Disease  [address], [place] [id number]  Phone: [phone]  Fax: [phone]    Nahun Negron), Internal Medicine; Nephrology  [address] [place] [address]  Phone: [phone]  Fax: [phone] Pascale Askew), Cardiac Electrophysiology; Cardiovascular Disease  [address], [place] [id number]  Phone: [phone]  Fax: [phone]    Nahun Negron), Internal Medicine; Nephrology  [address] [place] [address]  Phone: [phone]  Fax: [phone]    Jermaine Santos), Internal Medicine  [address] [place] [address]  [address]  [place] [address]  Phone: [phone]  Fax: [phone]    Nakia Brady (DO), Nephrology  [address] [place] [address] [address]  [place] [address]  Phone: [phone]  Fax: [phone]

## 2017-08-15 NOTE — PROGRESS NOTE ADULT - SUBJECTIVE AND OBJECTIVE BOX
[facility] Cardiology Progress Note  _______________________________    Pt. seen and examined. No new cardiac-related complaints.    Telemetry - sinus , PVC, V triplet.    T(C): 36.9 (08-15-17 @ 04:11), Max: 36.9 (08-15-17 @ 04:11)  HR: 93 (08-15-17 @ 04:11) (78 - 93)  BP: 137/79 (08-15-17 @ 04:11) (124/72 - 143/82)  RR: 18 (08-15-17 @ 04:11) (18 - 20)  SpO2: 96% (08-15-17 @ 04:11) (96% - 98%)  I&O's Summary    14 Aug 2017 07:01  -  15 Aug 2017 07:00  --------------------------------------------------------  IN: 150 mL / OUT: 750 mL / NET: -600 mL    15 Aug 2017 07:01  -  15 Aug 2017 09:27  --------------------------------------------------------  IN: 0 mL / OUT: 250 mL / NET: -250 mL        PHYSICAL EXAM:  GENERAL: Alert, NAD  NECK: Supple, No JVD, No carotid bruit.  CHEST/LUNG: cta b/l.  HEART: S1 S2 normal, RRR,  No murmurs, rubs, or gallops  ABDOMEN: Soft, Nontender, Nondistended; Bowel sounds present  EXTREMITIES: 1+ Edema.    LABS:                        12.9   5.3   )-----------( 214      ( 14 Aug 2017 06:59 )             40.5     08-15    138  |  102  |  21  ----------------------------<  191<H>  4.9   |  24  |  1.43<H>    Ca    9.1      15 Aug 2017 06:37        CARDIAC MARKERS ( 10 Aug 2017 20:35 )  x     / <0.01 ng/mL / 78 U/L / x     / 2.7 ng/mL  CARDIAC MARKERS ( 10 Aug 2017 06:49 )  x     / <0.01 ng/mL / 86 U/L / x     / 3.1 ng/mL  CARDIAC MARKERS ( 10 Aug 2017 00:02 )  x     / <0.01 ng/mL / 71 U/L / x     / 2.9 ng/mL  CARDIAC MARKERS ( 09 Aug 2017 17:44 )  x     / <0.01 ng/mL / 80 U/L / x     / 3.3 ng/mL  CARDIAC MARKERS ( 09 Aug 2017 14:25 )  x     / <0.01 ng/mL / x     / x     / x            MEDICATIONS  (STANDING):  heparin  Injectable 5000 Unit(s) SubCutaneous every 8 hours  aspirin enteric coated 81 milliGRAM(s) Oral daily  clopidogrel Tablet 75 milliGRAM(s) Oral at bedtime  gabapentin 200 milliGRAM(s) Oral at bedtime  gabapentin 100 milliGRAM(s) Oral <User Schedule>  insulin glargine Injectable (LANTUS) 6 Unit(s) SubCutaneous every morning  insulin glargine Injectable (LANTUS) 6 Unit(s) SubCutaneous at bedtime  insulin lispro (HumaLOG) corrective regimen sliding scale   SubCutaneous three times a day before meals  insulin lispro (HumaLOG) corrective regimen sliding scale   SubCutaneous at bedtime  dextrose 5%. 1000 milliLiter(s) (50 mL/Hr) IV Continuous <Continuous>  dextrose 50% Injectable 12.5 Gram(s) IV Push once  dextrose 50% Injectable 25 Gram(s) IV Push once  dextrose 50% Injectable 25 Gram(s) IV Push once  isosorbide   mononitrate ER Tablet (IMDUR) 60 milliGRAM(s) Oral daily  furosemide    Tablet 20 milliGRAM(s) Oral daily  polyethylene glycol 3350 17 Gram(s) Oral daily  senna 2 Tablet(s) Oral at bedtime  ceFAZolin   IVPB 1 milliGRAM(s) IV Intermittent every 8 hours    MEDICATIONS  (PRN):  dextrose Gel 1 Dose(s) Oral once PRN Blood Glucose LESS THAN 70 milliGRAM(s)/deciliter  glucagon  Injectable 1 milliGRAM(s) IntraMuscular once PRN Glucose LESS THAN 70 milligrams/deciliter      RADIOLOGY & ADDITIONAL TESTS:

## 2017-08-15 NOTE — PROGRESS NOTE ADULT - GASTROINTESTINAL
Soft, non-tender, no hepatosplenomegaly, normal bowel sounds
detailed exam

## 2017-08-15 NOTE — DISCHARGE NOTE ADULT - CARE PLAN
Principal Discharge DX:	Acute on chronic systolic (congestive) heart failure  Goal:	[employer] AICD/ PACEMAKER PLACE  Instructions for follow-up, activity and diet:	[employer] MODEL # [id number] placed 8/14/2017 with Dr Askew  MRI compatible only after 6 weeks   DO NOT LIFT LEFT ARM OVER YOUR HEAD FOR 2 WEEKS  KEEP SURGICAL ARE CLEAN AND DRY, DO NOT SHOWER FOR 5 DAYS  Follow instructions given to you in your EP package at bedside   KEEP APPOINTMENT SCHEDULED WITH CARDIOLOGY ON SEPTEMBER 7, 2017 AT 9:50 AM  USE ENTRANCE #1 OF THE HOSPITAL  Return to emergency for difficulty breathing, chest pain, fever, dizziness or worsen  Secondary Diagnosis:	CAD (coronary artery disease)  Goal:	plan as above  Instructions for follow-up, activity and diet:	Schedule follow up with your primary care physician and cardiology  continue current medications  Secondary Diagnosis:	DM type 2 (diabetes mellitus, type 2)  Secondary Diagnosis:	Essential hypertension Principal Discharge DX:	Acute on chronic systolic (congestive) heart failure  Goal:	[employer] AICD/ PACEMAKER PLACE  Instructions for follow-up, activity and diet:	MEDTRONIC MODEL # [id number] placed 8/14/2017 with Dr Askew  MRI compatible only after 6 weeks   DO NOT LIFT LEFT ARM OVER YOUR HEAD FOR 2 WEEKS  KEEP SURGICAL ARE CLEAN AND DRY, DO NOT SHOWER FOR 5 DAYS  Follow instructions given to you in your EP package at bedside   KEEP APPOINTMENT SCHEDULED WITH CARDIOLOGY ON SEPTEMBER 7, 2017 AT 9:50 AM  USE ENTRANCE [facility] THE [facility]  Return to emergency for difficulty breathing, chest pain, fever, dizziness or worsen  Secondary Diagnosis:	CAD (coronary artery disease)  Goal:	plan as above  Instructions for follow-up, activity and diet:	Schedule follow up with your primary care physician and cardiology  continue current medications  Secondary Diagnosis:	DM type 2 (diabetes mellitus, type 2)  Secondary Diagnosis:	Essential hypertension Principal Discharge DX:	Acute on chronic systolic (congestive) heart failure  Goal:	[employer] AICD/ PACEMAKER PLACEMENT FOLLOW-UP  Instructions for follow-up, activity and diet:	MEDTR[employer] MODEL # [id number] placed 8/14/2017 with Dr Asekw  MRI compatible only after 6 weeks   DO NOT LIFT LEFT ARM HIGHER THAN SHOULDER LEVEL FOR 4 WEEKS  KEEP SURGICAL ARE CLEAN AND DRY, DO NOT SHOWER FOR 5 DAYS  NO TUB BATHING FOR 4 WEEKS  Follow instructions given to you in your EP package at bedside   KEEP APPOINTMENT SCHEDULED WITH CARDIOLOGY ON SEPTEMBER 7, 2017 AT 9:50 AM  USE ENTRANCE [facility] OF THE HOSPITAL  Return to emergency for difficulty breathing, chest pain, fever, dizziness or worsen  Secondary Diagnosis:	CAD (coronary artery disease)  Goal:	plan as above  Instructions for follow-up, activity and diet:	Schedule follow up with your primary care physician and cardiology  continue current medications  Secondary Diagnosis:	DM type 2 (diabetes mellitus, type 2)  Instructions for follow-up, activity and diet:	Continue medications as prescribed  Follow up with your Primary MD within 1 week  Secondary Diagnosis:	Essential hypertension  Instructions for follow-up, activity and diet:	Continue medications as prescribed  Follow up with your Primary MD within 1 week.  Secondary Diagnosis:	CKD (chronic kidney disease) stage 3, GFR 30-59 ml/min  Instructions for follow-up, activity and diet:	Continue medications as prescribed  Follow up with Dr Brady your Nephrologist (kidney doctor) within 1 month Principal Discharge DX:	Acute on chronic systolic (congestive) heart failure  Goal:	[employer] AICD/ PACEMAKER PLACEMENT FOLLOW-UP  Instructions for follow-up, activity and diet:	MEDTR[employer]IC MODEL # [id number] placed 8/14/2017 with Dr Askew  MRI compatible only after 6 weeks   DO NOT LIFT LEFT ARM HIGHER THAN SHOULDER LEVEL FOR 4 WEEKS  KEEP SURGICAL ARE CLEAN AND DRY, DO NOT SHOWER FOR 5 DAYS  NO TUB BATHING FOR 4 WEEKS  Follow instructions given to you in your EP package at bedside   KEEP APPOINTMENT SCHEDULED WITH CARDIOLOGY ON SEPTEMBER 7, 2017 AT 9:50 AM  USE ENTRANCE [facility] OF THE [facility]  Return to emergency for difficulty breathing, chest pain, fever, dizziness or worsen  Secondary Diagnosis:	CAD (coronary artery disease)  Goal:	plan as above  Instructions for follow-up, activity and diet:	Schedule follow up with your primary care physician and cardiology  continue current medications  Secondary Diagnosis:	DM type 2 (diabetes mellitus, type 2)  Instructions for follow-up, activity and diet:	Continue medications as prescribed  Follow up with your Primary MD within 1 week  Secondary Diagnosis:	Essential hypertension  Instructions for follow-up, activity and diet:	Continue medications as prescribed  Follow up with your Primary MD within 1 week.  Secondary Diagnosis:	CKD (chronic kidney disease) stage 3, GFR 30-59 ml/min  Instructions for follow-up, activity and diet:	Continue medications as prescribed  Follow up with Dr Brady your Nephrologist (kidney doctor) within 1 month Principal Discharge DX:	Acute on chronic systolic (congestive) heart failure  Goal:	[employer] AICD/ PACEMAKER PLACEMENT FOLLOW-UP  Instructions for follow-up, activity and diet:	MEDTRONIC MODEL # [id number] placed 8/14/2017 with Dr Askew  MRI compatible only after 6 weeks   DO NOT LIFT LEFT ARM HIGHER THAN SHOULDER LEVEL FOR 4 WEEKS  KEEP SURGICAL ARE CLEAN AND DRY, DO NOT SHOWER FOR 5 DAYS  NO TUB BATHING FOR 4 WEEKS  Follow instructions given to you in your EP package at bedside   KEEP APPOINTMENT SCHEDULED WITH CARDIOLOGY ON SEPTEMBER 7, 2017 AT 9:50 AM  USE ENTRANCE #1 OF THE HOSP[facility]  Return to emergency for difficulty breathing, chest pain, fever, dizziness or worsen  Secondary Diagnosis:	CAD (coronary artery disease)  Goal:	plan as above  Instructions for follow-up, activity and diet:	Schedule follow up with your primary care physician and cardiology  continue current medications  Secondary Diagnosis:	DM type 2 (diabetes mellitus, type 2)  Instructions for follow-up, activity and diet:	Continue medications as prescribed  Follow up with your Primary MD within 1 week  Secondary Diagnosis:	Essential hypertension  Instructions for follow-up, activity and diet:	Continue medications as prescribed  Follow up with your Primary MD within 1 week.  Secondary Diagnosis:	CKD (chronic kidney disease) stage 3, GFR 30-59 ml/min  Instructions for follow-up, activity and diet:	Continue medications as prescribed  Follow up with Dr Brady your Nephrologist (kidney doctor) within 1 month Principal Discharge DX:	Acute on chronic systolic (congestive) heart failure  Goal:	[employer] AICD/ PACEMAKER PLACEMENT FOLLOW-UP  Instructions for follow-up, activity and diet:	MEDTR[employer]IC MODEL # [id number] placed 8/14/2017 with Dr Askew  MRI compatible only after 6 weeks   DO NOT LIFT LEFT ARM HIGHER THAN SHOULDER LEVEL FOR 4 WEEKS  KEEP SURGICAL ARE CLEAN AND DRY, DO NOT SHOWER FOR 5 DAYS  NO TUB BATHING FOR 4 WEEKS  Follow instructions given to you in your EP package at bedside   KEEP APPOINTMENT SCHEDULED WITH CARDIOLOGY ON SEPTEMBER 7, 2017 AT 9:50 AM  USE ENTRANCE #[facility] OF THE HOSP[facility]  Return to emergency for difficulty breathing, chest pain, fever, dizziness or worsen  Secondary Diagnosis:	CAD (coronary artery disease)  Goal:	plan as above  Instructions for follow-up, activity and diet:	Schedule follow up with your primary care physician and cardiology  continue current medications  Secondary Diagnosis:	DM type 2 (diabetes mellitus, type 2)  Instructions for follow-up, activity and diet:	Continue medications as prescribed  Follow up with your Primary MD within 1 week  Secondary Diagnosis:	Essential hypertension  Instructions for follow-up, activity and diet:	Continue medications as prescribed  Follow up with your Primary MD within 1 week.  Secondary Diagnosis:	CKD (chronic kidney disease) stage 3, GFR 30-59 ml/min  Instructions for follow-up, activity and diet:	Continue medications as prescribed  Follow up with Dr Brady your Nephrologist (kidney doctor) within 1 month

## 2017-08-15 NOTE — PROGRESS NOTE ADULT - RESPIRATORY
Breath Sounds equal & clear to percussion & auscultation, no accessory muscle use
detailed exam
Breath Sounds equal & clear to percussion & auscultation, no accessory muscle use
Breath Sounds equal & clear to percussion & auscultation, no accessory muscle use
detailed exam
Breath Sounds equal & clear to percussion & auscultation, no accessory muscle use

## 2017-08-15 NOTE — DISCHARGE NOTE ADULT - PATIENT PORTAL LINK FT
“You can access the Follow[facility] Patient Portal, offered by [facility], by registering with the following website: [email or web address]”

## 2017-08-15 NOTE — PROGRESS NOTE ADULT - CONSTITUTIONAL
Well-developed, well nourished

## 2017-08-15 NOTE — DISCHARGE NOTE ADULT - SECONDARY DIAGNOSIS.
CAD (coronary artery disease) DM type 2 (diabetes mellitus, type 2) Essential hypertension CKD (chronic kidney disease) stage 3, GFR 30-59 ml/min

## 2017-08-15 NOTE — DISCHARGE NOTE ADULT - HOSPITAL COURSE
to be completed by attending to be completed by attending MD 89 y/o male PMH HTN HLD CAD SCHF prsents w/ SCHF exacerbation which has improved with diuresis. pt developed heart block s/p PPM placement and now feels well and stable for discharge home.

## 2017-08-16 LAB
ANION GAP SERPL CALC-SCNC: 11 MMOL/L — SIGNIFICANT CHANGE UP (ref 5–17)
BUN SERPL-MCNC: 19 MG/DL — SIGNIFICANT CHANGE UP (ref 7–23)
CALCIUM SERPL-MCNC: 9.2 MG/DL — SIGNIFICANT CHANGE UP (ref 8.4–10.5)
CHLORIDE SERPL-SCNC: 100 MMOL/L — SIGNIFICANT CHANGE UP (ref 96–108)
CO2 SERPL-SCNC: 25 MMOL/L — SIGNIFICANT CHANGE UP (ref 22–31)
CREAT SERPL-MCNC: 1.43 MG/DL — HIGH (ref 0.5–1.3)
GLUCOSE SERPL-MCNC: 170 MG/DL — HIGH (ref 70–99)
POTASSIUM SERPL-MCNC: 5.1 MMOL/L — SIGNIFICANT CHANGE UP (ref 3.5–5.3)
POTASSIUM SERPL-SCNC: 5.1 MMOL/L — SIGNIFICANT CHANGE UP (ref 3.5–5.3)
SODIUM SERPL-SCNC: 136 MMOL/L — SIGNIFICANT CHANGE UP (ref 135–145)

## 2017-08-16 PROCEDURE — 93010 ELECTROCARDIOGRAM REPORT: CPT

## 2017-08-16 RX ORDER — FUROSEMIDE 40 MG
40 TABLET ORAL DAILY
Qty: 0 | Refills: 0 | Status: DISCONTINUED | OUTPATIENT
Start: 2017-08-16 | End: 2017-08-17

## 2017-08-16 RX ORDER — ISOSORBIDE MONONITRATE 60 MG/1
1 TABLET, EXTENDED RELEASE ORAL
Qty: 30 | Refills: 0 | OUTPATIENT
Start: 2017-08-16 | End: 2017-09-15

## 2017-08-16 RX ORDER — FUROSEMIDE 40 MG
1 TABLET ORAL
Qty: 30 | Refills: 0 | OUTPATIENT
Start: 2017-08-16 | End: 2017-09-15

## 2017-08-16 RX ORDER — LISINOPRIL 2.5 MG/1
2.5 TABLET ORAL DAILY
Qty: 0 | Refills: 0 | Status: DISCONTINUED | OUTPATIENT
Start: 2017-08-16 | End: 2017-08-16

## 2017-08-16 RX ADMIN — HEPARIN SODIUM 5000 UNIT(S): 5000 INJECTION INTRAVENOUS; SUBCUTANEOUS at 21:44

## 2017-08-16 RX ADMIN — INSULIN GLARGINE 6 UNIT(S): 100 INJECTION, SOLUTION SUBCUTANEOUS at 08:53

## 2017-08-16 RX ADMIN — POLYETHYLENE GLYCOL 3350 17 GRAM(S): 17 POWDER, FOR SOLUTION ORAL at 13:05

## 2017-08-16 RX ADMIN — GABAPENTIN 100 MILLIGRAM(S): 400 CAPSULE ORAL at 08:53

## 2017-08-16 RX ADMIN — GABAPENTIN 100 MILLIGRAM(S): 400 CAPSULE ORAL at 13:22

## 2017-08-16 RX ADMIN — Medication 81 MILLIGRAM(S): at 13:05

## 2017-08-16 RX ADMIN — Medication: at 10:14

## 2017-08-16 RX ADMIN — CLOPIDOGREL BISULFATE 75 MILLIGRAM(S): 75 TABLET, FILM COATED ORAL at 21:44

## 2017-08-16 RX ADMIN — Medication 20 MILLIGRAM(S): at 05:28

## 2017-08-16 RX ADMIN — SENNA PLUS 2 TABLET(S): 8.6 TABLET ORAL at 21:44

## 2017-08-16 RX ADMIN — Medication 1: at 13:22

## 2017-08-16 RX ADMIN — INSULIN GLARGINE 6 UNIT(S): 100 INJECTION, SOLUTION SUBCUTANEOUS at 21:44

## 2017-08-16 RX ADMIN — CARVEDILOL PHOSPHATE 6.25 MILLIGRAM(S): 80 CAPSULE, EXTENDED RELEASE ORAL at 17:35

## 2017-08-16 RX ADMIN — HEPARIN SODIUM 5000 UNIT(S): 5000 INJECTION INTRAVENOUS; SUBCUTANEOUS at 05:28

## 2017-08-16 RX ADMIN — GABAPENTIN 200 MILLIGRAM(S): 400 CAPSULE ORAL at 21:44

## 2017-08-16 RX ADMIN — HEPARIN SODIUM 5000 UNIT(S): 5000 INJECTION INTRAVENOUS; SUBCUTANEOUS at 13:22

## 2017-08-16 RX ADMIN — ISOSORBIDE MONONITRATE 60 MILLIGRAM(S): 60 TABLET, EXTENDED RELEASE ORAL at 13:05

## 2017-08-16 RX ADMIN — CARVEDILOL PHOSPHATE 6.25 MILLIGRAM(S): 80 CAPSULE, EXTENDED RELEASE ORAL at 05:28

## 2017-08-16 NOTE — PROGRESS NOTE ADULT - PROBLEM SELECTOR PLAN 3
-  - troponins have been negative.  - outpatient nuclear stress in april showed evidence of distal inferolateral and inferoapical injury without signficant ischemia.   - No plan for cardiac cath at this time.  - hold coreg as noted above.  - cont imdur 60 mg po daily.  - no more chest pain.
-  - troponins have been negative.  - outpatient nuclear stress in april showed evidence of distal inferolateral and inferoapical injury without signficant ischemia.   - No plan for cardiac cath at this time.  - hold coreg as noted above.  - cont imdur 60 mg po daily.  - no more chest pain.
-  - troponins have been negative.  - outpatient nuclear stress in april showed evidence of distal inferolateral and inferoapical injury without significant ischemia.   - No plan for cardiac cath at this time.  - cont coreg 6.25 mg po BID.  - cont imdur 60 mg po daily.  - no more chest pain.
-  - troponins have been negative.  - outpatient nuclear stress in april showed evidence of distal inferolateral and inferoapical injury without significant ischemia.   - No plan for cardiac cath at this time.  - hold coreg as noted above.  - cont imdur 60 mg po daily.  - no more chest pain.
-  - troponins have been negative.  - outpatient nuclear stress in april showed evidence of distal inferolateral and inferoapical injury without significant ischemia.   - No plan for cardiac cath at this time.  - resume coreg as noted.  - cont imdur 60 mg po daily.  - no more chest pain.
no renal objection to EP procedures.  please contact if plan for contrast dye
-  - troponins have been negative.  - outpatient nuclear stress in april showed evidence of distal inferolateral and inferoapical injury without signficant ischemia.   - No plan for cardiac cath at this time.  - hold coreg as noted above.  - Increase imdur to 60 mg po daily.
no renal objection to EP procedures.  please contact if plan for contrast dye

## 2017-08-16 NOTE — PROGRESS NOTE ADULT - PROBLEM SELECTOR PROBLEM 2
Acute on chronic systolic (congestive) heart failure
High degree atrioventricular block
Acute on chronic systolic (congestive) heart failure
High degree atrioventricular block

## 2017-08-16 NOTE — PROGRESS NOTE ADULT - SUBJECTIVE AND OBJECTIVE BOX
[facility]   [phone]  RENAL FOLLOW UP NOTE  --------------------------------------------------------------------------------  Chief Complaint:    24 hour events/subjective:    states overall breathing is better    PAST HISTORY  --------------------------------------------------------------------------------  No significant changes to PMH, PSH, FHx, SHx, unless otherwise noted    ALLERGIES & MEDICATIONS  --------------------------------------------------------------------------------  Allergies    No Known Allergies    Intolerances      Standing Inpatient Medications  heparin  Injectable 5000 Unit(s) SubCutaneous every 8 hours  aspirin enteric coated 81 milliGRAM(s) Oral daily  clopidogrel Tablet 75 milliGRAM(s) Oral at bedtime  gabapentin 200 milliGRAM(s) Oral at bedtime  gabapentin 100 milliGRAM(s) Oral <User Schedule>  insulin glargine Injectable (LANTUS) 6 Unit(s) SubCutaneous every morning  insulin glargine Injectable (LANTUS) 6 Unit(s) SubCutaneous at bedtime  insulin lispro (HumaLOG) corrective regimen sliding scale   SubCutaneous three times a day before meals  insulin lispro (HumaLOG) corrective regimen sliding scale   SubCutaneous at bedtime  dextrose 5%. 1000 milliLiter(s) IV Continuous <Continuous>  dextrose 50% Injectable 12.5 Gram(s) IV Push once  dextrose 50% Injectable 25 Gram(s) IV Push once  dextrose 50% Injectable 25 Gram(s) IV Push once  isosorbide   mononitrate ER Tablet (IMDUR) 60 milliGRAM(s) Oral daily  polyethylene glycol 3350 17 Gram(s) Oral daily  senna 2 Tablet(s) Oral at bedtime  carvedilol 6.25 milliGRAM(s) Oral every 12 hours  furosemide    Tablet 40 milliGRAM(s) Oral daily    PRN Inpatient Medications  dextrose Gel 1 Dose(s) Oral once PRN  glucagon  Injectable 1 milliGRAM(s) IntraMuscular once PRN      REVIEW OF SYSTEMS  --------------------------------------------------------------------------------    Gen: denies fatigue, fevers/chills,  CVS: denies chest pain/palpitations  Resp: denies worsening SOB/Cough  GI: Denies N/V/Abd pain  : Denies dysuria/oliguria/hematuria    All other systems were reviewed and are negative, except as noted.    VITALS/PHYSICAL EXAM  --------------------------------------------------------------------------------  T(C): 37.2 (08-16-17 @ 14:27), Max: 37.2 (08-16-17 @ 14:27)  HR: 108 (08-16-17 @ 14:27) (90 - 108)  BP: 117/60 (08-16-17 @ 14:27) (113/73 - 118/77)  RR: 18 (08-16-17 @ 14:27) (18 - 18)  SpO2: 96% (08-16-17 @ 14:27) (95% - 98%)  Wt(kg): --        08-15-17 @ 07:01  -  08-16-17 @ 07:00  --------------------------------------------------------  IN: 400 mL / OUT: 1380 mL / NET: -980 mL      Physical Exam:  	    Physical Exam:  	Gen: alert oriented place person and date   	Pulm: Decreased breath sounds b/l bases. no rales or ronchi or wheezing  	CV: RRR, S1/S2. no rub  	Back: No CVA tenderness; no sacral edema  	Abd: +BS, soft, nontender/nondistended  	: No suprapubic tenderness.               Extremity: No cyanosis, 2-  edema no clubbing  	  LABS/STUDIES  --------------------------------------------------------------------------------              11.8   6.2   >-----------<  196      [08-15-17 @ 10:56]              36.5     136  |  100  |  19  ----------------------------<  170      [08-16-17 @ 05:50]  5.1   |  25  |  1.43        Ca     9.2     [08-16-17 @ 05:50]            Creatinine Trend:  SCr 1.43 [08-16 @ 05:50]  SCr 1.43 [08-15 @ 06:37]  SCr 1.51 [08-14 @ 06:59]  SCr 1.49 [08-13 @ 08:53]  SCr 1.57 [08-12 @ 06:51]              Urinalysis - [08-12-17 @ 07:04]      Color  / Appearance Clear / SG 1.005 / pH 6.5      Gluc Negative / Ketone Negative  / Bili Negative / Urobili Negative       Blood Negative / Protein Negative / Leuk Est Negative / Nitrite Negative      RBC  / WBC 0-2 / Hyaline  / Gran  / Sq Epi  / Non Sq Epi  / Bacteria     Urine Creatinine 38      [08-12-17 @ 09:06]  Urine Protein <4      [08-12-17 @ 09:06]    HbA1c 7.9      [08-10-17 @ 08:05]

## 2017-08-16 NOTE — PROGRESS NOTE ADULT - PROBLEM SELECTOR PROBLEM 4
Acute on chronic systolic (congestive) heart failure
Abdominal pain

## 2017-08-16 NOTE — PROGRESS NOTE ADULT - PROBLEM SELECTOR PLAN 2
-  - BB held  - Planned for AICD placement today.
-  - BB held  - VALERIE barkley. appreciated.  - Planning for AICD. Risks/benefits/alternatives discussed with the patient and his daughter.
-  - BB held  - VALERIE barkley. appreciated.  - d/w billy Olguin, and patient. Will plan for AICD. Risks/benefits/alternatives discussed with the patient and his daughter.
-  - Will restart BB now that AICD is in place.
-  - s/p AICD.
Agree with gentle diuresis  Continue daily weights  Strict I/O please.
-  - hold BB as noted.  - EP eval.  - cont tele. monitoring.
Agree with gentle diuresis  Continue daily weights, improved today by 0.9kg  Strict I/O please.

## 2017-08-16 NOTE — PROGRESS NOTE ADULT - PROBLEM SELECTOR PROBLEM 1
Arteriosclerotic cardiovascular disease (ASCVD)
CKD (chronic kidney disease) stage 3, GFR 30-59 ml/min
CKD (chronic kidney disease) stage 3, GFR 30-59 ml/min
Acute on chronic systolic (congestive) heart failure

## 2017-08-16 NOTE — PROVIDER CONTACT NOTE (OTHER) - BACKGROUND
Pt admitted for Heart failure. S/p AICD placement inserted on 8/14/17.
Pt admitted with Heart Failure, CE - x4 BNP of 5796. Pt has chronic stable Angina, CKD, type 2 DM, Essential hypertension. Pt previously had a 3.78 second pause.
initial c/o SOB and HF.

## 2017-08-16 NOTE — PROGRESS NOTE ADULT - SUBJECTIVE AND OBJECTIVE BOX
[facility] Cardiology Progress Note  _______________________________    Pt. seen and examined. No new cardiac-related complaints.    Telemetry - sinus 's, 4 and 6 beats of NSVT.    T(C): 37 (08-16-17 @ 04:15), Max: 37 (08-16-17 @ 04:15)  HR: 95 (08-16-17 @ 04:15) (90 - 105)  BP: 113/74 (08-16-17 @ 04:15) (113/73 - 126/75)  RR: 18 (08-16-17 @ 04:15) (18 - 18)  SpO2: 95% (08-16-17 @ 04:15) (95% - 99%)  I&O's Summary    15 Aug 2017 07:01  -  16 Aug 2017 07:00  --------------------------------------------------------  IN: 400 mL / OUT: 1380 mL / NET: -980 mL        PHYSICAL EXAM:    GENERAL: Alert, NAD  NECK: Supple, No JVD, No carotid bruit.  CHEST/LUNG: cta b/l. L pectoral ICD site c/d/i.  HEART: S1 S2 normal, RRR,  No murmurs, rubs, or gallops  ABDOMEN: Soft, Nontender, Nondistended; Bowel sounds present  EXTREMITIES: 1+ Edema.    LABS:                        11.8   6.2   )-----------( 196      ( 15 Aug 2017 10:56 )             36.5     08-16    136  |  100  |  19  ----------------------------<  170<H>  5.1   |  25  |  1.43<H>    Ca    9.2      16 Aug 2017 05:50        CARDIAC MARKERS ( 10 Aug 2017 20:35 )  x     / <0.01 ng/mL / 78 U/L / x     / 2.7 ng/mL  CARDIAC MARKERS ( 10 Aug 2017 06:49 )  x     / <0.01 ng/mL / 86 U/L / x     / 3.1 ng/mL  CARDIAC MARKERS ( 10 Aug 2017 00:02 )  x     / <0.01 ng/mL / 71 U/L / x     / 2.9 ng/mL  CARDIAC MARKERS ( 09 Aug 2017 17:44 )  x     / <0.01 ng/mL / 80 U/L / x     / 3.3 ng/mL  CARDIAC MARKERS ( 09 Aug 2017 14:25 )  x     / <0.01 ng/mL / x     / x     / x            MEDICATIONS  (STANDING):  heparin  Injectable 5000 Unit(s) SubCutaneous every 8 hours  aspirin enteric coated 81 milliGRAM(s) Oral daily  clopidogrel Tablet 75 milliGRAM(s) Oral at bedtime  gabapentin 200 milliGRAM(s) Oral at bedtime  gabapentin 100 milliGRAM(s) Oral <User Schedule>  insulin glargine Injectable (LANTUS) 6 Unit(s) SubCutaneous every morning  insulin glargine Injectable (LANTUS) 6 Unit(s) SubCutaneous at bedtime  insulin lispro (HumaLOG) corrective regimen sliding scale   SubCutaneous three times a day before meals  insulin lispro (HumaLOG) corrective regimen sliding scale   SubCutaneous at bedtime  dextrose 5%. 1000 milliLiter(s) (50 mL/Hr) IV Continuous <Continuous>  dextrose 50% Injectable 12.5 Gram(s) IV Push once  dextrose 50% Injectable 25 Gram(s) IV Push once  dextrose 50% Injectable 25 Gram(s) IV Push once  isosorbide   mononitrate ER Tablet (IMDUR) 60 milliGRAM(s) Oral daily  furosemide    Tablet 20 milliGRAM(s) Oral daily  polyethylene glycol 3350 17 Gram(s) Oral daily  senna 2 Tablet(s) Oral at bedtime  carvedilol 6.25 milliGRAM(s) Oral every 12 hours    MEDICATIONS  (PRN):  dextrose Gel 1 Dose(s) Oral once PRN Blood Glucose LESS THAN 70 milliGRAM(s)/deciliter  glucagon  Injectable 1 milliGRAM(s) IntraMuscular once PRN Glucose LESS THAN 70 milligrams/deciliter      RADIOLOGY & ADDITIONAL TESTS:

## 2017-08-16 NOTE — PROGRESS NOTE ADULT - PROBLEM SELECTOR PROBLEM 3
Chronic stable angina
High degree atrioventricular block
Chronic stable angina
High degree atrioventricular block

## 2017-08-16 NOTE — PROVIDER CONTACT NOTE (OTHER) - ASSESSMENT
patient in bed, asymptomatic, breathing unlabored. /70, p 75, r 18, 96% on RA.
Pt A+O x4, pt was previously sleeping when episode occurred, VSS, pt denies chest pain and shortness of breath.
Pt sleeping at time of event. 5 beat WCT on tele monitoring. Pt denies CP, SOB, or palpitations. /73, HR 90, RR 18, SpO2 98%.

## 2017-08-16 NOTE — PROGRESS NOTE ADULT - PROBLEM SELECTOR PLAN 4
-  - cont lasix 20 mg po daily.   - coreg as noted above.  - will start low dose lisinopril 2.5 mg po daily.   - DC planning.  - see me in office 1-2 weeks after discharge.    Jermaine Santos M.D., [facility]  [phone] -  - cont lasix 20 mg po daily.   - coreg as noted above.  - will start low dose lisinopril 2.5 mg po daily.   - DC planning.  - see me in office 1-2 weeks after discharge. Will repeat metabolic profile at that time.    Jermaine Santos M.D., [facility]  [phone]

## 2017-08-17 VITALS
OXYGEN SATURATION: 98 % | DIASTOLIC BLOOD PRESSURE: 83 MMHG | TEMPERATURE: 97 F | RESPIRATION RATE: 18 BRPM | SYSTOLIC BLOOD PRESSURE: 126 MMHG | HEART RATE: 84 BPM

## 2017-08-17 RX ORDER — INSULIN LISPRO 100/ML
0 VIAL (ML) SUBCUTANEOUS
Qty: 0 | Refills: 0 | COMMUNITY
Start: 2017-08-17

## 2017-08-17 RX ORDER — FUROSEMIDE 40 MG
1 TABLET ORAL
Qty: 0 | Refills: 0 | COMMUNITY
Start: 2017-08-17

## 2017-08-17 RX ADMIN — INSULIN GLARGINE 6 UNIT(S): 100 INJECTION, SOLUTION SUBCUTANEOUS at 09:36

## 2017-08-17 RX ADMIN — Medication 1: at 13:51

## 2017-08-17 RX ADMIN — ISOSORBIDE MONONITRATE 60 MILLIGRAM(S): 60 TABLET, EXTENDED RELEASE ORAL at 13:51

## 2017-08-17 RX ADMIN — GABAPENTIN 100 MILLIGRAM(S): 400 CAPSULE ORAL at 09:36

## 2017-08-17 RX ADMIN — GABAPENTIN 100 MILLIGRAM(S): 400 CAPSULE ORAL at 13:51

## 2017-08-17 RX ADMIN — HEPARIN SODIUM 5000 UNIT(S): 5000 INJECTION INTRAVENOUS; SUBCUTANEOUS at 06:08

## 2017-08-17 RX ADMIN — CARVEDILOL PHOSPHATE 6.25 MILLIGRAM(S): 80 CAPSULE, EXTENDED RELEASE ORAL at 06:08

## 2017-08-17 RX ADMIN — Medication 81 MILLIGRAM(S): at 13:51

## 2017-08-17 RX ADMIN — Medication 40 MILLIGRAM(S): at 06:08

## 2017-08-17 NOTE — DIETITIAN INITIAL EVALUATION ADULT. - NUTRITION INTERVENTION
Collaboration and Referral of Nutrition Care/Meals and Snack/Nutrition Education Medical Food Supplements

## 2017-08-17 NOTE — DIETITIAN INITIAL EVALUATION ADULT. - PROBLEM SELECTOR PLAN 1
Given hx of CAD and CABG concern for ACS. Patient does not endorse pleurisy to pain.  Reviewed chart under MRN [id number]: Last stress test (nuclear 2015) noted to be abnormal study.  C/W home regimen of Aspirin, Plavix, Statin  Monitor on tele  Trend CE  Check TTE  Primary day team to place cardiology consult and eval method of ischemic eval.

## 2017-08-17 NOTE — DIETITIAN INITIAL EVALUATION ADULT. - NS AS NUTRI INTERV ED CONTENT
Heart Failure Nutrition Therapy, CHO counting, diabetes label reading tips, provided written material, pt was disinterested in diet ed

## 2017-08-17 NOTE — DIETITIAN INITIAL EVALUATION ADULT. - OTHER INFO
seen for length of stay. states 25% of meals secondary to disliking foods. obtained preferences. last BM 5 days ago as per pt-nursing made aware. NKFA

## 2017-08-17 NOTE — PROGRESS NOTE ADULT - SUBJECTIVE AND OBJECTIVE BOX
[facility]   [phone]  RENAL FOLLOW UP NOTE  --------------------------------------------------------------------------------  Chief Complaint:    24 hour events/subjective:    States has been urinating more.  Still feels fatigue overall.  Breathing is better.    PAST HISTORY  --------------------------------------------------------------------------------  No significant changes to PMH, PSH, FHx, SHx, unless otherwise noted    ALLERGIES & MEDICATIONS  --------------------------------------------------------------------------------  Allergies    No Known Allergies    Intolerances      Standing Inpatient Medications  heparin  Injectable 5000 Unit(s) SubCutaneous every 8 hours  aspirin enteric coated 81 milliGRAM(s) Oral daily  clopidogrel Tablet 75 milliGRAM(s) Oral at bedtime  gabapentin 200 milliGRAM(s) Oral at bedtime  gabapentin 100 milliGRAM(s) Oral <User Schedule>  insulin glargine Injectable (LANTUS) 6 Unit(s) SubCutaneous every morning  insulin glargine Injectable (LANTUS) 6 Unit(s) SubCutaneous at bedtime  insulin lispro (HumaLOG) corrective regimen sliding scale   SubCutaneous three times a day before meals  insulin lispro (HumaLOG) corrective regimen sliding scale   SubCutaneous at bedtime  dextrose 5%. 1000 milliLiter(s) IV Continuous <Continuous>  dextrose 50% Injectable 12.5 Gram(s) IV Push once  dextrose 50% Injectable 25 Gram(s) IV Push once  dextrose 50% Injectable 25 Gram(s) IV Push once  isosorbide   mononitrate ER Tablet (IMDUR) 60 milliGRAM(s) Oral daily  polyethylene glycol 3350 17 Gram(s) Oral daily  senna 2 Tablet(s) Oral at bedtime  carvedilol 6.25 milliGRAM(s) Oral every 12 hours  furosemide    Tablet 40 milliGRAM(s) Oral daily    PRN Inpatient Medications  dextrose Gel 1 Dose(s) Oral once PRN  glucagon  Injectable 1 milliGRAM(s) IntraMuscular once PRN      REVIEW OF SYSTEMS  --------------------------------------------------------------------------------    Gen: denies fatigue, fevers/chills,  CVS: denies chest pain/palpitations  Resp: denies Worsening SOB/Cough  GI: Denies N/V/Abd pain  : Denies dysuria/oliguria/hematuria.  Increased urination.    All other systems were reviewed and are negative, except as noted.    VITALS/PHYSICAL EXAM  --------------------------------------------------------------------------------  T(C): 36.3 (08-17-17 @ 12:32), Max: 36.7 (08-16-17 @ 20:23)  HR: 84 (08-17-17 @ 12:32) (84 - 96)  BP: 126/83 (08-17-17 @ 12:32) (126/83 - 133/80)  RR: 18 (08-17-17 @ 12:32) (17 - 18)  SpO2: 98% (08-17-17 @ 12:32) (96% - 98%)  Wt(kg): --        08-16-17 @ 07:01  -  08-17-17 @ 07:00  --------------------------------------------------------  IN: 120 mL / OUT: 0 mL / NET: 120 mL    08-17-17 @ 07:01  -  08-17-17 @ 14:33  --------------------------------------------------------  IN: 300 mL / OUT: 0 mL / NET: 300 mL      Physical Exam:  	    Physical Exam:  	Gen: alert oriented place person and date   	Pulm: Mild Decreased breath sounds b/l bases. no rales or ronchi or wheezing  	CV: RRR, S1/S2. no rub  	Back: No CVA tenderness; no sacral edema  	Abd: +BS, soft, nontender/nondistended  	: No suprapubic tenderness.               Extremity: No cyanosis,1-2+ edema no clubbing  	    LABS/STUDIES  --------------------------------------------------------------------------------    136  |  100  |  19  ----------------------------<  170      [08-16-17 @ 05:50]  5.1   |  25  |  1.43        Ca     9.2     [08-16-17 @ 05:50]            Creatinine Trend:  SCr 1.43 [08-16 @ 05:50]  SCr 1.43 [08-15 @ 06:37]  SCr 1.51 [08-14 @ 06:59]  SCr 1.49 [08-13 @ 08:53]  SCr 1.57 [08-12 @ 06:51]              Urinalysis - [08-12-17 @ 07:04]      Color  / Appearance Clear / SG 1.005 / pH 6.5      Gluc Negative / Ketone Negative  / Bili Negative / Urobili Negative       Blood Negative / Protein Negative / Leuk Est Negative / Nitrite Negative      RBC  / WBC 0-2 / Hyaline  / Gran  / Sq Epi  / Non Sq Epi  / Bacteria     Urine Creatinine 38      [08-12-17 @ 09:06]  Urine Protein <4      [08-12-17 @ 09:06]    HbA1c 7.9      [08-10-17 @ 08:05]

## 2017-08-17 NOTE — DIETITIAN INITIAL EVALUATION ADULT. - PROBLEM SELECTOR PLAN 4
Per review of other chart, patient's Cr range from 1.6-1.9  No signs of MONISHA  Trend BMP  Renally-dose medications

## 2017-08-17 NOTE — DIETITIAN INITIAL EVALUATION ADULT. - PROBLEM SELECTOR PLAN 2
Reviewed chart under MRN [id number]: Last EF TTE 2015 EF 37%  Concern for acute on chronic systolic heart failure as patient with increased LE edema, signs of pleural effusion on left and elevated BNP 5796 (prior BNP in 400s-500s). No hypoxia on exam. No persistent tachycardia on tele (SR 80s in ED).  Will try trial of IV lasix overnight  Primary day team to reeval diuresis regimen in AM  Monitor daily weights  Strict I/O  Check LE doppler for DVT

## 2017-08-17 NOTE — PROGRESS NOTE ADULT - ASSESSMENT
88 y/o male with sCHF, CKD3, DM2 who presents with worsening SOB/orthopnea, LE edema and rising creatinine in setting of diuresis:
88 y/o male with sCHF, CKD3, DM2 who presents with worsening SOB/orthopnea, LE edema and rising creatinine in setting of diuresis:
88 y/o male with sCHF, CKD3, DM2 who presents with worsening SOB/orthopnea, LE edema and rising creatinine in setting of diuresis:    1- ckd III  2- CHF  3-  cardiomyopathy    cr steady   still fluid overloaded.   Coreg 6.25 Milligram twice a day.  Lasix 40 mg by mouth daily.  2 g sodium diet.  Daily weights.  Borderline hyper kalemia however, hopefully with increase Lasix it would Remain in range
89 M h/o CAD s/p CABG in 2005 and PCI in 2009, ischemic cardiomyopathy (EF 29% on nuclear stress test 4/11/17), DM type II, HTN, carotid stenosis s/p R CEA a/w CP and acute on chronic systolic HF. Course c/b high degree AV block.
89 M h/o CAD s/p CABG in 2005 and PCI in 2009, ischemic cardiomyopathy (EF 29% on nuclear stress test 4/11/17), DM type II, HTN, carotid stenosis s/p R CEA a/w CP and acute on chronic systolic HF. Course c/b high degree AV block. Now s/p AICD placement.
89 M h/o CAD s/p CABG in 2005 and PCI in 2009, ischemic cardiomyopathy (EF 29% on nuclear stress test 4/11/17), DM type II, HTN, carotid stenosis s/p R CEA a/w CP and acute on chronic systolic HF. Course c/b high degree AV block. Now s/p AICD placement.
89 M h/o CAD s/p CABG in 2005 and PCI in 2009, ischemic cardiomyopathy (EF 29% on nuclear stress test 4/11/17), DM type II, HTN, carotid stenosis s/p R CEA a/w CP and acute on chronic systolic HF. Course c/b high degree AV block. Now s/p AICD placement. Stable from cardiac standpoint.  Can follow up with dr Santos within one week.  ACEI if not going to participate in clinical trial
89 M h/o CAD s/p CABG in 2005 and PCI in 2009, ischemic cardiomyopathy (EF 29% on nuclear stress test 4/11/17), DM type II, HTN, carotid stenosis s/p R CEA who presented to the hospital with worsening dyspnea and CP 2/2 to acute decompensated systolic CHF which improved w/ diuresis.  pt also found to have high grade AV block s/p PPM. D/C planning for today     AV block - s/p  PPM  well tolerated      Acute decompensated Systolic CHF exacerbation, resolved, c/w current dose of lasix     MONISHA on CKD3  - back to baseline monitor      Stable angina / HTN HLD CAD CABG - stable c/w current meds     DM2 c/w ss     DVT PPX .
89 M h/o CAD s/p CABG in 2005 and PCI in 2009, ischemic cardiomyopathy (EF 29% on nuclear stress test 4/11/17), DM type II, HTN, carotid stenosis s/p R CEA who presented to the hospital with worsening dyspnea and CP.     AV block - hold BB EP consult pending     Acute decompensated Systolic CHF exacerbation -Ef 15/18 lasix dosed strict I/o flluid restriction monitor closely     MONISHA on CKD3  - cr trending up given will need further diuresis nephro consult dr Brooks     Stable angina / HTN HLD CAD CABG - stable c/w current meds     DM2 c/w ss     DVT PPX
89 y/o male with sCHF, CKD3, DM2 who presents with worsening SOB/orthopnea, LE edema and rising creatinine in setting of diuresis:    1- ckd III  2- CHF  3-  cardiomyopathy    cr steady   cont with lasix agree with po dose lasix 20mg daily   add hydralazine 10mg tid
This is an 89 yo male w/ hx of DM, CAD s/p CABG (2005), ICM (EF 15-20% as per 2d Echo 8/2017) who was admitted for SOB/orthopnea/LE edema found to have high grade AV block and pauses up to 3.7 seconds. Patient s/p ICD implant on 8/14/17 ([employer] [id number]).
· Assessment		  88 y/o male with sCHF, CKD3, DM2 who presents with worsening SOB/orthopnea, LE edema and rising creatinine in setting of diuresis:    1- ckd III  2- CHF  3-  cardiomyopathy    cr steady   still fluid overloaded.   increase lasix to 40mg daily  daily weights  coreg 6.25mg bid  d/w cards
89 M h/o CAD s/p CABG in 2005 and PCI in 2009, ischemic cardiomyopathy (EF 29% on nuclear stress test 4/11/17), DM type II, HTN, carotid stenosis s/p R CEA who presented to the hospital with worsening dyspnea and CP 2/2 to acute decompensated systolic CHF which improved w/ diuresis.  pt also found to have high grade AV block s/p PPM DC planning for tomorrow in AM.       AV block - s/p  PPM  well tollerated     Acute decompensated Systolic CHF exacerbation  resolved     MONISHA on CKD3  - back to baseline monitor      Stable angina / HTN HLD CAD CABG - stable c/w current meds     DM2 c/w ss     DVT PPX     DC HOME IN AM.
89 M h/o CAD s/p CABG in 2005 and PCI in 2009, ischemic cardiomyopathy (EF 29% on nuclear stress test 4/11/17), DM type II, HTN, carotid stenosis s/p R CEA who presented to the hospital with worsening dyspnea and CP.     AV block - NPO Sunday for PPM Monday as per EP  discussed with Dr monson       Acute decompensated Systolic CHF exacerbation -IV dosing as per cardio / nephro.  fluid status improved     MONISHA on CKD3  - nephro consult appreciated monitor.      Stable angina / HTN HLD CAD CABG - stable c/w current meds     DM2 c/w ss     DVT PPX
89 M h/o CAD s/p CABG in 2005 and PCI in 2009, ischemic cardiomyopathy (EF 29% on nuclear stress test 4/11/17), DM type II, HTN, carotid stenosis s/p R CEA who presented to the hospital with worsening dyspnea and CP 2/2 to acute decompensated systolic CHF which improved w/ diuresis.  pt also found to have high grade AV block.     AV block - for PPM this afternoon      Acute decompensated Systolic CHF exacerbation -improved monitor     MONISHA on CKD3  - renally dose meds cr improved US kidney reviewed no LE DVT doppler reviewed     Stable angina / HTN HLD CAD CABG - stable c/w current meds     DM2 c/w ss     DVT PPX
89 M h/o CAD s/p CABG in 2005 and PCI in 2009, ischemic cardiomyopathy (EF 29% on nuclear stress test 4/11/17), DM type II, HTN, carotid stenosis s/p R CEA who presented to the hospital with worsening dyspnea and CP.     Acute decompensated Systolic CHF exacerbation -- lasix dosed strict I/o flluid restriction monitor closely     Stable angina / HTN HLD CAD CABG - bp minimally elevated currently monitor for now if remains elevated will titrate meds.      CKD3 renally dose meds monitor cr     DM2 start SS monitor for now     DVT PPX
89 M h/o CAD s/p CABG in 2005 and PCI in 2009, ischemic cardiomyopathy (EF 29% on nuclear stress test 4/11/17), DM type II, HTN, carotid stenosis s/p R CEA who presented to the hospital with worsening dyspnea and CP.     AV block - NPO tonight for PPM Monday as per EP  discussed with Dr monson       Acute decompensated Systolic CHF exacerbation -IV dosing as per cardio / nephro.  fluid status improved     MONISHA on CKD3  - renally dose meds cr improved     Stable angina / HTN HLD CAD CABG - stable c/w current meds     DM2 c/w ss     DVT PPX

## 2017-08-17 NOTE — PROGRESS NOTE ADULT - SUBJECTIVE AND OBJECTIVE BOX
Pt. seen and examined. Feeling well. No new cp or dyspnea.  Has ambulated to  and denies any new difficulty.    Telemetry - no new arrhythmia      I&O's Summary    16 Aug 2017 07:01  -  17 Aug 2017 07:00  --------------------------------------------------------  IN: 120 mL / OUT: 0 mL / NET: 120 mL    17 Aug 2017 07:01  -  17 Aug 2017 10:43  --------------------------------------------------------  IN: 300 mL / OUT: 0 mL / NET: 300 mL        PHYSICAL EXAM:  GENERAL: Alert, NAD.  NECK: Supple, No JVD, no carotid bruit.  CHEST/LUNG: Clear to auscultation bilaterally; No wheezes, rales, or rhonchi.  HEART: S1 S2 normal, RRR; No murmurs, rubs, or gallops  ABDOMEN: Soft, Nontender, Nondistended; Bowel sounds present  EXTREMITIES:  + edema.      LABS:                        11.8   6.2   )-----------( 196      ( 15 Aug 2017 10:56 )             36.5     08-16    136  |  100  |  19  ----------------------------<  170<H>  5.1   |  25  |  1.43<H>    Ca    9.2      16 Aug 2017 05:50        CARDIAC MARKERS ( 10 Aug 2017 20:35 )  x     / <0.01 ng/mL / 78 U/L / x     / 2.7 ng/mL              MEDICATIONS  (STANDING):  heparin  Injectable 5000 Unit(s) SubCutaneous every 8 hours  aspirin enteric coated 81 milliGRAM(s) Oral daily  clopidogrel Tablet 75 milliGRAM(s) Oral at bedtime  gabapentin 200 milliGRAM(s) Oral at bedtime  gabapentin 100 milliGRAM(s) Oral <User Schedule>  insulin glargine Injectable (LANTUS) 6 Unit(s) SubCutaneous every morning  insulin glargine Injectable (LANTUS) 6 Unit(s) SubCutaneous at bedtime  insulin lispro (HumaLOG) corrective regimen sliding scale   SubCutaneous three times a day before meals  insulin lispro (HumaLOG) corrective regimen sliding scale   SubCutaneous at bedtime  dextrose 5%. 1000 milliLiter(s) (50 mL/Hr) IV Continuous <Continuous>  dextrose 50% Injectable 12.5 Gram(s) IV Push once  dextrose 50% Injectable 25 Gram(s) IV Push once  dextrose 50% Injectable 25 Gram(s) IV Push once  isosorbide   mononitrate ER Tablet (IMDUR) 60 milliGRAM(s) Oral daily  polyethylene glycol 3350 17 Gram(s) Oral daily  senna 2 Tablet(s) Oral at bedtime  carvedilol 6.25 milliGRAM(s) Oral every 12 hours  furosemide    Tablet 40 milliGRAM(s) Oral daily    MEDICATIONS  (PRN):  dextrose Gel 1 Dose(s) Oral once PRN Blood Glucose LESS THAN 70 milliGRAM(s)/deciliter  glucagon  Injectable 1 milliGRAM(s) IntraMuscular once PRN Glucose LESS THAN 70 milligrams/deciliter      RADIOLOGY & ADDITIONAL TESTS:

## 2017-08-17 NOTE — PROGRESS NOTE ADULT - SUBJECTIVE AND OBJECTIVE BOX
Patient is a 89y old  Male who presents with a chief complaint of chest pain SOB (15 Aug 2017 11:00)      SUBJECTIVE / OVERNIGHT EVENTS:    patient seen and examined at bedside  sitting comfortably, sleepy and without complaints  no acute events overnight    Vital Signs Last 24 Hrs  T(C): 36.6 (17 Aug 2017 04:12), Max: 37.2 (16 Aug 2017 14:27)  T(F): 97.9 (17 Aug 2017 04:12), Max: 99 (16 Aug 2017 14:27)  HR: 91 (17 Aug 2017 04:12) (91 - 108)  BP: 133/80 (17 Aug 2017 04:12) (117/60 - 133/80)  BP(mean): --  RR: 18 (17 Aug 2017 04:12) (17 - 18)  SpO2: 96% (17 Aug 2017 04:12) (96% - 96%)  I&O's Summary    16 Aug 2017 07:01  -  17 Aug 2017 07:00  --------------------------------------------------------  IN: 120 mL / OUT: 0 mL / NET: 120 mL    17 Aug 2017 07:01  -  17 Aug 2017 11:32  --------------------------------------------------------  IN: 300 mL / OUT: 0 mL / NET: 300 mL        GENERAL: NAD, AAOx3  HEAD:  Atraumatic, Normocephalic  EYES: EOMI, PERRLA, conjunctiva and sclera clear  NECK: Supple, No JVD, No LAD  CHEST/LUNG: Clear to auscultation bilaterally; No wheeze  HEART: Regular rate and rhythm; No murmurs, rubs, or gallops  ABDOMEN: Soft, Nontender, Nondistended; Bowel sounds present  EXTREMITIES:  2+ Peripheral Pulses, No clubbing, cyanosis, or edema  SKIN: No rashes or lesions      LABS:    08-16    136  |  100  |  19  ----------------------------<  170<H>  5.1   |  25  |  1.43<H>    Ca    9.2      16 Aug 2017 05:50        CAPILLARY BLOOD GLUCOSE  142 (17 Aug 2017 08:52)  177 (16 Aug 2017 21:19)  148 (16 Aug 2017 18:08)  162 (16 Aug 2017 13:02)                RADIOLOGY & ADDITIONAL TESTS:    Imaging Personally Reviewed:  [x] YES  [ ] NO    Consultant(s) Notes Reviewed:  [x] YES  [ ] NO      MEDICATIONS  (STANDING):  heparin  Injectable 5000 Unit(s) SubCutaneous every 8 hours  aspirin enteric coated 81 milliGRAM(s) Oral daily  clopidogrel Tablet 75 milliGRAM(s) Oral at bedtime  gabapentin 200 milliGRAM(s) Oral at bedtime  gabapentin 100 milliGRAM(s) Oral <User Schedule>  insulin glargine Injectable (LANTUS) 6 Unit(s) SubCutaneous every morning  insulin glargine Injectable (LANTUS) 6 Unit(s) SubCutaneous at bedtime  insulin lispro (HumaLOG) corrective regimen sliding scale   SubCutaneous three times a day before meals  insulin lispro (HumaLOG) corrective regimen sliding scale   SubCutaneous at bedtime  dextrose 5%. 1000 milliLiter(s) (50 mL/Hr) IV Continuous <Continuous>  dextrose 50% Injectable 12.5 Gram(s) IV Push once  dextrose 50% Injectable 25 Gram(s) IV Push once  dextrose 50% Injectable 25 Gram(s) IV Push once  isosorbide   mononitrate ER Tablet (IMDUR) 60 milliGRAM(s) Oral daily  polyethylene glycol 3350 17 Gram(s) Oral daily  senna 2 Tablet(s) Oral at bedtime  carvedilol 6.25 milliGRAM(s) Oral every 12 hours  furosemide    Tablet 40 milliGRAM(s) Oral daily    MEDICATIONS  (PRN):  dextrose Gel 1 Dose(s) Oral once PRN Blood Glucose LESS THAN 70 milliGRAM(s)/deciliter  glucagon  Injectable 1 milliGRAM(s) IntraMuscular once PRN Glucose LESS THAN 70 milligrams/deciliter      Care Discussed with Consultants/Other Providers [x] YES  [ ] NO    HEALTH ISSUES - PROBLEM Dx:  Abdominal pain: Abdominal pain  High degree atrioventricular block: High degree atrioventricular block  Acute on chronic systolic (congestive) heart failure: Acute on chronic systolic (congestive) heart failure  Chronic stable angina: Chronic stable angina  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

## 2017-09-11 ENCOUNTER — APPOINTMENT (OUTPATIENT)
Dept: ELECTROPHYSIOLOGY | Facility: CLINIC | Age: 82
End: 2017-09-11
Payer: MEDICARE

## 2017-09-11 ENCOUNTER — NON-APPOINTMENT (OUTPATIENT)
Age: 82
End: 2017-09-11

## 2017-09-11 VITALS
HEART RATE: 102 BPM | SYSTOLIC BLOOD PRESSURE: 94 MMHG | DIASTOLIC BLOOD PRESSURE: 58 MMHG | BODY MASS INDEX: 30.52 KG/M2 | OXYGEN SATURATION: 99 % | HEIGHT: 71 IN | WEIGHT: 218 LBS

## 2017-09-11 DIAGNOSIS — I42.9 CARDIOMYOPATHY, UNSPECIFIED: ICD-10-CM

## 2017-09-11 PROCEDURE — 99024 POSTOP FOLLOW-UP VISIT: CPT

## 2017-09-11 RX ORDER — ATORVASTATIN CALCIUM 20 MG/1
20 TABLET, FILM COATED ORAL
Qty: 30 | Refills: 4 | Status: ACTIVE | COMMUNITY
Start: 2017-09-11

## 2017-09-11 RX ORDER — ISOSORBIDE MONONITRATE 60 MG/1
60 TABLET, EXTENDED RELEASE ORAL DAILY
Refills: 0 | Status: ACTIVE | COMMUNITY
Start: 2017-09-11

## 2017-09-11 RX ORDER — CARVEDILOL 6.25 MG/1
6.25 TABLET, FILM COATED ORAL
Qty: 60 | Refills: 0 | Status: ACTIVE | COMMUNITY
Start: 2017-09-11

## 2017-09-11 RX ORDER — INSULIN GLARGINE 100 [IU]/ML
100 INJECTION, SOLUTION SUBCUTANEOUS
Refills: 0 | Status: ACTIVE | COMMUNITY
Start: 2017-09-11

## 2017-09-11 RX ORDER — FUROSEMIDE 40 MG/1
40 TABLET ORAL DAILY
Refills: 0 | Status: ACTIVE | COMMUNITY
Start: 2017-09-11

## 2017-09-11 RX ORDER — CLOPIDOGREL 75 MG/1
75 TABLET, FILM COATED ORAL DAILY
Refills: 0 | Status: ACTIVE | COMMUNITY
Start: 2017-09-11

## 2017-09-11 RX ORDER — GABAPENTIN 100 MG/1
100 CAPSULE ORAL 3 TIMES DAILY
Refills: 0 | Status: ACTIVE | COMMUNITY
Start: 2017-09-11

## 2018-03-25 ENCOUNTER — INPATIENT (INPATIENT)
Facility: HOSPITAL | Age: 83
LOS: 2 days | Discharge: HOME | DRG: 552 | End: 2018-03-28
Attending: STUDENT IN AN ORGANIZED HEALTH CARE EDUCATION/TRAINING PROGRAM | Admitting: STUDENT IN AN ORGANIZED HEALTH CARE EDUCATION/TRAINING PROGRAM
Payer: MEDICARE

## 2018-03-25 VITALS
DIASTOLIC BLOOD PRESSURE: 78 MMHG | HEART RATE: 80 BPM | RESPIRATION RATE: 16 BRPM | OXYGEN SATURATION: 98 % | TEMPERATURE: 98 F | SYSTOLIC BLOOD PRESSURE: 140 MMHG

## 2018-03-25 DIAGNOSIS — Z95.1 PRESENCE OF AORTOCORONARY BYPASS GRAFT: Chronic | ICD-10-CM

## 2018-03-25 DIAGNOSIS — M54.9 DORSALGIA, UNSPECIFIED: ICD-10-CM

## 2018-03-25 LAB
ALBUMIN SERPL ELPH-MCNC: 3.1 G/DL — LOW (ref 3.3–5)
ALP SERPL-CCNC: 151 U/L — HIGH (ref 40–120)
ALT FLD-CCNC: 15 U/L RC — SIGNIFICANT CHANGE UP (ref 10–45)
ANION GAP SERPL CALC-SCNC: 13 MMOL/L — SIGNIFICANT CHANGE UP (ref 5–17)
APPEARANCE UR: CLEAR — SIGNIFICANT CHANGE UP
AST SERPL-CCNC: 18 U/L — SIGNIFICANT CHANGE UP (ref 10–40)
BASE EXCESS BLDV CALC-SCNC: 2 MMOL/L — SIGNIFICANT CHANGE UP (ref -2–2)
BASOPHILS # BLD AUTO: 0 K/UL — SIGNIFICANT CHANGE UP (ref 0–0.2)
BASOPHILS NFR BLD AUTO: 0.4 % — SIGNIFICANT CHANGE UP (ref 0–2)
BILIRUB SERPL-MCNC: 0.5 MG/DL — SIGNIFICANT CHANGE UP (ref 0.2–1.2)
BILIRUB UR-MCNC: NEGATIVE — SIGNIFICANT CHANGE UP
BUN SERPL-MCNC: 49 MG/DL — HIGH (ref 7–23)
CA-I SERPL-SCNC: 1.24 MMOL/L — SIGNIFICANT CHANGE UP (ref 1.12–1.3)
CALCIUM SERPL-MCNC: 9.1 MG/DL — SIGNIFICANT CHANGE UP (ref 8.4–10.5)
CHLORIDE BLDV-SCNC: 105 MMOL/L — SIGNIFICANT CHANGE UP (ref 96–108)
CHLORIDE SERPL-SCNC: 102 MMOL/L — SIGNIFICANT CHANGE UP (ref 96–108)
CO2 BLDV-SCNC: 28 MMOL/L — SIGNIFICANT CHANGE UP (ref 22–30)
CO2 SERPL-SCNC: 23 MMOL/L — SIGNIFICANT CHANGE UP (ref 22–31)
COLOR SPEC: YELLOW — SIGNIFICANT CHANGE UP
CREAT SERPL-MCNC: 1.39 MG/DL — HIGH (ref 0.5–1.3)
DIFF PNL FLD: NEGATIVE — SIGNIFICANT CHANGE UP
EOSINOPHIL # BLD AUTO: 0.1 K/UL — SIGNIFICANT CHANGE UP (ref 0–0.5)
EOSINOPHIL NFR BLD AUTO: 1.5 % — SIGNIFICANT CHANGE UP (ref 0–6)
EPI CELLS # UR: SIGNIFICANT CHANGE UP /HPF
GAS PNL BLDV: 136 MMOL/L — SIGNIFICANT CHANGE UP (ref 136–145)
GAS PNL BLDV: SIGNIFICANT CHANGE UP
GLUCOSE BLDV-MCNC: 271 MG/DL — HIGH (ref 70–99)
GLUCOSE SERPL-MCNC: 296 MG/DL — HIGH (ref 70–99)
GLUCOSE UR QL: NEGATIVE — SIGNIFICANT CHANGE UP
HCO3 BLDV-SCNC: 27 MMOL/L — SIGNIFICANT CHANGE UP (ref 21–29)
HCT VFR BLD CALC: 34.3 % — LOW (ref 39–50)
HCT VFR BLDA CALC: 35 % — LOW (ref 39–50)
HGB BLD CALC-MCNC: 11.4 G/DL — LOW (ref 13–17)
HGB BLD-MCNC: 11.5 G/DL — LOW (ref 13–17)
KETONES UR-MCNC: NEGATIVE — SIGNIFICANT CHANGE UP
LACTATE BLDV-MCNC: 2 MMOL/L — SIGNIFICANT CHANGE UP (ref 0.7–2)
LEUKOCYTE ESTERASE UR-ACNC: NEGATIVE — SIGNIFICANT CHANGE UP
LYMPHOCYTES # BLD AUTO: 1.6 K/UL — SIGNIFICANT CHANGE UP (ref 1–3.3)
LYMPHOCYTES # BLD AUTO: 25.7 % — SIGNIFICANT CHANGE UP (ref 13–44)
MCHC RBC-ENTMCNC: 30.3 PG — SIGNIFICANT CHANGE UP (ref 27–34)
MCHC RBC-ENTMCNC: 33.5 GM/DL — SIGNIFICANT CHANGE UP (ref 32–36)
MCV RBC AUTO: 90.6 FL — SIGNIFICANT CHANGE UP (ref 80–100)
MONOCYTES # BLD AUTO: 0.4 K/UL — SIGNIFICANT CHANGE UP (ref 0–0.9)
MONOCYTES NFR BLD AUTO: 6.9 % — SIGNIFICANT CHANGE UP (ref 2–14)
NEUTROPHILS # BLD AUTO: 4.2 K/UL — SIGNIFICANT CHANGE UP (ref 1.8–7.4)
NEUTROPHILS NFR BLD AUTO: 65.4 % — SIGNIFICANT CHANGE UP (ref 43–77)
NITRITE UR-MCNC: NEGATIVE — SIGNIFICANT CHANGE UP
PCO2 BLDV: 46 MMHG — SIGNIFICANT CHANGE UP (ref 35–50)
PH BLDV: 7.38 — SIGNIFICANT CHANGE UP (ref 7.35–7.45)
PH UR: 6 — SIGNIFICANT CHANGE UP (ref 5–8)
PLATELET # BLD AUTO: 189 K/UL — SIGNIFICANT CHANGE UP (ref 150–400)
PO2 BLDV: 31 MMHG — SIGNIFICANT CHANGE UP (ref 25–45)
POTASSIUM BLDV-SCNC: 5 MMOL/L — SIGNIFICANT CHANGE UP (ref 3.5–5)
POTASSIUM SERPL-MCNC: 5.5 MMOL/L — HIGH (ref 3.5–5.3)
POTASSIUM SERPL-SCNC: 5.5 MMOL/L — HIGH (ref 3.5–5.3)
PROT SERPL-MCNC: 6.5 G/DL — SIGNIFICANT CHANGE UP (ref 6–8.3)
PROT UR-MCNC: 30 MG/DL
RBC # BLD: 3.79 M/UL — LOW (ref 4.2–5.8)
RBC # FLD: 16 % — HIGH (ref 10.3–14.5)
RBC CASTS # UR COMP ASSIST: SIGNIFICANT CHANGE UP /HPF (ref 0–2)
SAO2 % BLDV: 51 % — LOW (ref 67–88)
SODIUM SERPL-SCNC: 138 MMOL/L — SIGNIFICANT CHANGE UP (ref 135–145)
SP GR SPEC: >1.03 — HIGH (ref 1.01–1.02)
UROBILINOGEN FLD QL: NEGATIVE — SIGNIFICANT CHANGE UP
WBC # BLD: 6.4 K/UL — SIGNIFICANT CHANGE UP (ref 3.8–10.5)
WBC # FLD AUTO: 6.4 K/UL — SIGNIFICANT CHANGE UP (ref 3.8–10.5)
WBC UR QL: SIGNIFICANT CHANGE UP /HPF (ref 0–5)

## 2018-03-25 PROCEDURE — 99285 EMERGENCY DEPT VISIT HI MDM: CPT | Mod: GC

## 2018-03-25 PROCEDURE — 74177 CT ABD & PELVIS W/CONTRAST: CPT | Mod: 26

## 2018-03-25 PROCEDURE — 99223 1ST HOSP IP/OBS HIGH 75: CPT

## 2018-03-25 RX ORDER — LIDOCAINE 4 G/100G
1 CREAM TOPICAL
Qty: 9 | Refills: 0 | OUTPATIENT
Start: 2018-03-25

## 2018-03-25 RX ORDER — SODIUM CHLORIDE 9 MG/ML
500 INJECTION INTRAMUSCULAR; INTRAVENOUS; SUBCUTANEOUS ONCE
Qty: 0 | Refills: 0 | Status: COMPLETED | OUTPATIENT
Start: 2018-03-25 | End: 2018-03-25

## 2018-03-25 RX ORDER — ACETAMINOPHEN 500 MG
1000 TABLET ORAL ONCE
Qty: 0 | Refills: 0 | Status: COMPLETED | OUTPATIENT
Start: 2018-03-25 | End: 2018-03-25

## 2018-03-25 RX ORDER — LIDOCAINE 4 G/100G
1 CREAM TOPICAL ONCE
Qty: 0 | Refills: 0 | Status: COMPLETED | OUTPATIENT
Start: 2018-03-25 | End: 2018-03-25

## 2018-03-25 RX ORDER — FAMOTIDINE 10 MG/ML
20 INJECTION INTRAVENOUS DAILY
Qty: 0 | Refills: 0 | Status: DISCONTINUED | OUTPATIENT
Start: 2018-03-25 | End: 2018-03-28

## 2018-03-25 RX ORDER — SODIUM CHLORIDE 9 MG/ML
500 INJECTION, SOLUTION INTRAVENOUS ONCE
Qty: 0 | Refills: 0 | Status: COMPLETED | OUTPATIENT
Start: 2018-03-25 | End: 2018-03-25

## 2018-03-25 RX ORDER — MORPHINE SULFATE 50 MG/1
4 CAPSULE, EXTENDED RELEASE ORAL ONCE
Qty: 0 | Refills: 0 | Status: DISCONTINUED | OUTPATIENT
Start: 2018-03-25 | End: 2018-03-25

## 2018-03-25 RX ORDER — PANTOPRAZOLE SODIUM 20 MG/1
40 TABLET, DELAYED RELEASE ORAL ONCE
Qty: 0 | Refills: 0 | Status: COMPLETED | OUTPATIENT
Start: 2018-03-25 | End: 2018-03-25

## 2018-03-25 RX ORDER — LIDOCAINE 4 G/100G
15 CREAM TOPICAL ONCE
Qty: 0 | Refills: 0 | Status: COMPLETED | OUTPATIENT
Start: 2018-03-25 | End: 2018-03-25

## 2018-03-25 RX ADMIN — Medication 1000 MILLIGRAM(S): at 19:44

## 2018-03-25 RX ADMIN — SODIUM CHLORIDE 500 MILLILITER(S): 9 INJECTION, SOLUTION INTRAVENOUS at 19:54

## 2018-03-25 RX ADMIN — Medication 30 MILLILITER(S): at 21:47

## 2018-03-25 RX ADMIN — SODIUM CHLORIDE 500 MILLILITER(S): 9 INJECTION INTRAMUSCULAR; INTRAVENOUS; SUBCUTANEOUS at 15:43

## 2018-03-25 RX ADMIN — LIDOCAINE 15 MILLILITER(S): 4 CREAM TOPICAL at 21:55

## 2018-03-25 RX ADMIN — MORPHINE SULFATE 4 MILLIGRAM(S): 50 CAPSULE, EXTENDED RELEASE ORAL at 19:44

## 2018-03-25 RX ADMIN — FAMOTIDINE 20 MILLIGRAM(S): 10 INJECTION INTRAVENOUS at 21:53

## 2018-03-25 RX ADMIN — LIDOCAINE 1 PATCH: 4 CREAM TOPICAL at 21:05

## 2018-03-25 RX ADMIN — Medication 400 MILLIGRAM(S): at 16:49

## 2018-03-25 RX ADMIN — MORPHINE SULFATE 4 MILLIGRAM(S): 50 CAPSULE, EXTENDED RELEASE ORAL at 15:43

## 2018-03-25 NOTE — ED ADULT NURSE NOTE - PMH
CAD (coronary artery disease)  CABG X 4 2006, cardiac stent 2011  DM type 2 (diabetes mellitus, type 2)    GERD (gastroesophageal reflux disease)    HLD (hyperlipidemia)    HTN (hypertension)    Melanoma  left shoulder  SOB (shortness of breath)  5/2013

## 2018-03-25 NOTE — ED ADULT NURSE NOTE - PSH
S/P CABG x 4  2006  S/P carotid endarterectomy  right side 2002  S/P tonsillectomy    Skin cancer  Multiple Moh's procedures on face, scalp, back

## 2018-03-25 NOTE — ED ADULT NURSE REASSESSMENT NOTE - NS ED NURSE REASSESS COMMENT FT1
Pt states he feels bad, and lightheaded. Patient's daughter is not comfortable with discharge at this time. MD Guzman made aware and at bedside.

## 2018-03-25 NOTE — ED PROVIDER NOTE - OBJECTIVE STATEMENT
89M pmhx dm, cad w/ CABG 2006, stent 2011, GERD, htn, here c/o back and abdominal pain. Per pt the lbp has been going on for the last 1-2 week and is worse on the left side He cannot remember when or why the back pain started but today says its worse than ever. He denies incontinence, f/c, or focal extremity weakness. Re: his abdominal pain, he estimates it's been going on the last 2 months. He cannot pinpoint what he was doing when it started but claims its worse today than its ever been. It is not associated with nausea/vomiting/diarrhea, or f/c. Nothing makes it better or worse. No recent trauma. Denies recent falls or illness.

## 2018-03-25 NOTE — ED ADULT NURSE REASSESSMENT NOTE - NS ED NURSE REASSESS COMMENT FT1
Patient is back in a stretcher. VSS. See flowsheet. NAD at this time. Pt states that he does not feel lightheaded anymore, also that he feels "fine" at this time.

## 2018-03-25 NOTE — ED ADULT NURSE NOTE - OBJECTIVE STATEMENT
88 yo male comes to ED by ems from home for abd pain. Pt is a&o x2 at baseline and a&o x3 at this time in the ed. wife is at bedside. pt and wife report pt has had abd pain x2 months and has seen pmd for issue. He comes to ED today bc he states "I cant take it anymore". He denies chest pain and sob. His abd is soft non tender, non distended. He reports low back pain x2 weeks after a mechanical slip and fall in the bathroom. He reports he is eating and drinking very little and walks very little at home independently. He denies n/v/d, fevers, chills, bowel and bladder complaints. He denies taking meds at home for pain. Skin is warm and dry. Color is appropriate for race. VSS/ NAD. Safety and comfort maintained. Will continue to monitor.

## 2018-03-25 NOTE — ED ADULT NURSE REASSESSMENT NOTE - NS ED NURSE REASSESS COMMENT FT1
Care assumed of patient at this time. Patient is hypotensive/tachycardic, MD Tanner made aware. IVF ordered. Will recheck VS after IVF.

## 2018-03-25 NOTE — ED PROVIDER NOTE - CARE PLAN
Principal Discharge DX:	Back pain  Assessment and plan of treatment:	Follow up with primary care doctor, pain management and orthopedics

## 2018-03-25 NOTE — ED PROVIDER NOTE - PROGRESS NOTE DETAILS
Labs unchanged from baseline. CT scan suggestive of possible cystitis, however, U/A without obvious signs of cystitis. CT also shows L1 compression deformity. Upon re-examination, pt does have some ttp over L1. Per daughter (at bedside), it is following with a pain management physician at [facility], and was due for L-spine CT scan in 1-2 days to eval for spinal fx. She will provide todays results to the pain mgt physician to see whether he still needs another CT scan and for further f/u. Pt is ambulating with a walker, at his baseline. Offered admission for inpt PT eval, however, daughter does not Labs unchanged from baseline. CT scan suggestive of possible cystitis, however, U/A without obvious signs of cystitis. CT also shows L1 compression deformity. Upon re-examination, pt does have some ttp over L1. Per daughter (at bedside), it is following with a pain management physician at [facility], and was due for L-spine CT scan in 1-2 days to eval for spinal fx. She will provide todays results to the pain mgt physician to see whether he still needs another CT scan and for further f/u. Pt is ambulating with a walker, at his baseline. Offered admission for inpt PT eval, however, daughter does not feel that pt requires inpatient admission, prefers outpt workup. Stable for d/c home with PMD f/u. Upon d/c, pt began to have vomiting. Daughter no longer wishes to take pt. home. Will admit for further eval/mgt.

## 2018-03-25 NOTE — ED ADULT NURSE REASSESSMENT NOTE - NS ED NURSE REASSESS COMMENT FT1
social work contacted shaun barkley pt for assistance at home. Social work out of hospital and will follow up with pt tomorrow if admitted.

## 2018-03-26 DIAGNOSIS — M54.9 DORSALGIA, UNSPECIFIED: ICD-10-CM

## 2018-03-26 DIAGNOSIS — I25.10 ATHEROSCLEROTIC HEART DISEASE OF NATIVE CORONARY ARTERY WITHOUT ANGINA PECTORIS: ICD-10-CM

## 2018-03-26 DIAGNOSIS — R10.9 UNSPECIFIED ABDOMINAL PAIN: ICD-10-CM

## 2018-03-26 DIAGNOSIS — I10 ESSENTIAL (PRIMARY) HYPERTENSION: ICD-10-CM

## 2018-03-26 DIAGNOSIS — Z29.9 ENCOUNTER FOR PROPHYLACTIC MEASURES, UNSPECIFIED: ICD-10-CM

## 2018-03-26 DIAGNOSIS — E11.9 TYPE 2 DIABETES MELLITUS WITHOUT COMPLICATIONS: ICD-10-CM

## 2018-03-26 DIAGNOSIS — R63.8 OTHER SYMPTOMS AND SIGNS CONCERNING FOOD AND FLUID INTAKE: ICD-10-CM

## 2018-03-26 LAB
ANION GAP SERPL CALC-SCNC: 13 MMOL/L — SIGNIFICANT CHANGE UP (ref 5–17)
BASOPHILS # BLD AUTO: 0.03 K/UL — SIGNIFICANT CHANGE UP (ref 0–0.2)
BASOPHILS NFR BLD AUTO: 0.4 % — SIGNIFICANT CHANGE UP (ref 0–2)
BUN SERPL-MCNC: 53 MG/DL — HIGH (ref 7–23)
CALCIUM SERPL-MCNC: 8.8 MG/DL — SIGNIFICANT CHANGE UP (ref 8.4–10.5)
CHLORIDE SERPL-SCNC: 104 MMOL/L — SIGNIFICANT CHANGE UP (ref 96–108)
CO2 SERPL-SCNC: 20 MMOL/L — LOW (ref 22–31)
CREAT SERPL-MCNC: 1.26 MG/DL — SIGNIFICANT CHANGE UP (ref 0.5–1.3)
CULTURE RESULTS: SIGNIFICANT CHANGE UP
EOSINOPHIL # BLD AUTO: 0.08 K/UL — SIGNIFICANT CHANGE UP (ref 0–0.5)
EOSINOPHIL NFR BLD AUTO: 1.2 % — SIGNIFICANT CHANGE UP (ref 0–6)
GLUCOSE SERPL-MCNC: 261 MG/DL — HIGH (ref 70–99)
HCT VFR BLD CALC: 33.5 % — LOW (ref 39–50)
HGB BLD-MCNC: 10.7 G/DL — LOW (ref 13–17)
IMM GRANULOCYTES NFR BLD AUTO: 0.3 % — SIGNIFICANT CHANGE UP (ref 0–1.5)
LYMPHOCYTES # BLD AUTO: 1.94 K/UL — SIGNIFICANT CHANGE UP (ref 1–3.3)
LYMPHOCYTES # BLD AUTO: 28.7 % — SIGNIFICANT CHANGE UP (ref 13–44)
MCHC RBC-ENTMCNC: 29.4 PG — SIGNIFICANT CHANGE UP (ref 27–34)
MCHC RBC-ENTMCNC: 31.9 GM/DL — LOW (ref 32–36)
MCV RBC AUTO: 92 FL — SIGNIFICANT CHANGE UP (ref 80–100)
MONOCYTES # BLD AUTO: 0.64 K/UL — SIGNIFICANT CHANGE UP (ref 0–0.9)
MONOCYTES NFR BLD AUTO: 9.5 % — SIGNIFICANT CHANGE UP (ref 2–14)
NEUTROPHILS # BLD AUTO: 4.06 K/UL — SIGNIFICANT CHANGE UP (ref 1.8–7.4)
NEUTROPHILS NFR BLD AUTO: 59.9 % — SIGNIFICANT CHANGE UP (ref 43–77)
PLATELET # BLD AUTO: 153 K/UL — SIGNIFICANT CHANGE UP (ref 150–400)
POTASSIUM SERPL-MCNC: 5.4 MMOL/L — HIGH (ref 3.5–5.3)
POTASSIUM SERPL-SCNC: 5.4 MMOL/L — HIGH (ref 3.5–5.3)
RBC # BLD: 3.64 M/UL — LOW (ref 4.2–5.8)
RBC # FLD: 16.9 % — HIGH (ref 10.3–14.5)
SODIUM SERPL-SCNC: 137 MMOL/L — SIGNIFICANT CHANGE UP (ref 135–145)
SPECIMEN SOURCE: SIGNIFICANT CHANGE UP
WBC # BLD: 6.77 K/UL — SIGNIFICANT CHANGE UP (ref 3.8–10.5)
WBC # FLD AUTO: 6.77 K/UL — SIGNIFICANT CHANGE UP (ref 3.8–10.5)

## 2018-03-26 PROCEDURE — 71046 X-RAY EXAM CHEST 2 VIEWS: CPT | Mod: 26

## 2018-03-26 PROCEDURE — 93970 EXTREMITY STUDY: CPT | Mod: 26

## 2018-03-26 PROCEDURE — 72148 MRI LUMBAR SPINE W/O DYE: CPT | Mod: 26

## 2018-03-26 PROCEDURE — 99221 1ST HOSP IP/OBS SF/LOW 40: CPT

## 2018-03-26 RX ORDER — INSULIN LISPRO 100/ML
1 VIAL (ML) SUBCUTANEOUS ONCE
Qty: 0 | Refills: 0 | Status: COMPLETED | OUTPATIENT
Start: 2018-03-26 | End: 2018-03-26

## 2018-03-26 RX ORDER — DEXTROSE 50 % IN WATER 50 %
1 SYRINGE (ML) INTRAVENOUS ONCE
Qty: 0 | Refills: 0 | Status: DISCONTINUED | OUTPATIENT
Start: 2018-03-26 | End: 2018-03-28

## 2018-03-26 RX ORDER — FUROSEMIDE 40 MG
40 TABLET ORAL DAILY
Qty: 0 | Refills: 0 | Status: DISCONTINUED | OUTPATIENT
Start: 2018-03-26 | End: 2018-03-28

## 2018-03-26 RX ORDER — INSULIN LISPRO 100/ML
VIAL (ML) SUBCUTANEOUS
Qty: 0 | Refills: 0 | Status: DISCONTINUED | OUTPATIENT
Start: 2018-03-26 | End: 2018-03-28

## 2018-03-26 RX ORDER — POLYETHYLENE GLYCOL 3350 17 G/17G
17 POWDER, FOR SOLUTION ORAL DAILY
Qty: 0 | Refills: 0 | Status: DISCONTINUED | OUTPATIENT
Start: 2018-03-26 | End: 2018-03-28

## 2018-03-26 RX ORDER — INSULIN GLARGINE 100 [IU]/ML
8 INJECTION, SOLUTION SUBCUTANEOUS AT BEDTIME
Qty: 0 | Refills: 0 | Status: DISCONTINUED | OUTPATIENT
Start: 2018-03-26 | End: 2018-03-28

## 2018-03-26 RX ORDER — INSULIN GLARGINE 100 [IU]/ML
8 INJECTION, SOLUTION SUBCUTANEOUS ONCE
Qty: 0 | Refills: 0 | Status: COMPLETED | OUTPATIENT
Start: 2018-03-26 | End: 2018-03-26

## 2018-03-26 RX ORDER — ACETAMINOPHEN 500 MG
650 TABLET ORAL EVERY 6 HOURS
Qty: 0 | Refills: 0 | Status: DISCONTINUED | OUTPATIENT
Start: 2018-03-26 | End: 2018-03-28

## 2018-03-26 RX ORDER — GLUCAGON INJECTION, SOLUTION 0.5 MG/.1ML
1 INJECTION, SOLUTION SUBCUTANEOUS ONCE
Qty: 0 | Refills: 0 | Status: DISCONTINUED | OUTPATIENT
Start: 2018-03-26 | End: 2018-03-28

## 2018-03-26 RX ORDER — SENNA PLUS 8.6 MG/1
2 TABLET ORAL AT BEDTIME
Qty: 0 | Refills: 0 | Status: DISCONTINUED | OUTPATIENT
Start: 2018-03-26 | End: 2018-03-28

## 2018-03-26 RX ORDER — ONDANSETRON 8 MG/1
4 TABLET, FILM COATED ORAL EVERY 6 HOURS
Qty: 0 | Refills: 0 | Status: DISCONTINUED | OUTPATIENT
Start: 2018-03-26 | End: 2018-03-28

## 2018-03-26 RX ORDER — INSULIN LISPRO 100/ML
VIAL (ML) SUBCUTANEOUS AT BEDTIME
Qty: 0 | Refills: 0 | Status: DISCONTINUED | OUTPATIENT
Start: 2018-03-26 | End: 2018-03-28

## 2018-03-26 RX ORDER — DEXTROSE 50 % IN WATER 50 %
12.5 SYRINGE (ML) INTRAVENOUS ONCE
Qty: 0 | Refills: 0 | Status: DISCONTINUED | OUTPATIENT
Start: 2018-03-26 | End: 2018-03-28

## 2018-03-26 RX ORDER — LIDOCAINE 4 G/100G
1 CREAM TOPICAL ONCE
Qty: 0 | Refills: 0 | Status: COMPLETED | OUTPATIENT
Start: 2018-03-26 | End: 2018-03-26

## 2018-03-26 RX ORDER — SODIUM CHLORIDE 9 MG/ML
1000 INJECTION, SOLUTION INTRAVENOUS
Qty: 0 | Refills: 0 | Status: DISCONTINUED | OUTPATIENT
Start: 2018-03-26 | End: 2018-03-28

## 2018-03-26 RX ORDER — DEXTROSE 50 % IN WATER 50 %
25 SYRINGE (ML) INTRAVENOUS ONCE
Qty: 0 | Refills: 0 | Status: DISCONTINUED | OUTPATIENT
Start: 2018-03-26 | End: 2018-03-28

## 2018-03-26 RX ORDER — SODIUM CHLORIDE 9 MG/ML
1000 INJECTION INTRAMUSCULAR; INTRAVENOUS; SUBCUTANEOUS ONCE
Qty: 0 | Refills: 0 | Status: COMPLETED | OUTPATIENT
Start: 2018-03-26 | End: 2018-03-26

## 2018-03-26 RX ORDER — DOCUSATE SODIUM 100 MG
100 CAPSULE ORAL THREE TIMES A DAY
Qty: 0 | Refills: 0 | Status: DISCONTINUED | OUTPATIENT
Start: 2018-03-26 | End: 2018-03-28

## 2018-03-26 RX ORDER — CLOPIDOGREL BISULFATE 75 MG/1
75 TABLET, FILM COATED ORAL DAILY
Qty: 0 | Refills: 0 | Status: DISCONTINUED | OUTPATIENT
Start: 2018-03-26 | End: 2018-03-28

## 2018-03-26 RX ORDER — HEPARIN SODIUM 5000 [USP'U]/ML
5000 INJECTION INTRAVENOUS; SUBCUTANEOUS EVERY 12 HOURS
Qty: 0 | Refills: 0 | Status: DISCONTINUED | OUTPATIENT
Start: 2018-03-26 | End: 2018-03-28

## 2018-03-26 RX ADMIN — Medication 2: at 17:26

## 2018-03-26 RX ADMIN — Medication 100 MILLIGRAM(S): at 12:04

## 2018-03-26 RX ADMIN — INSULIN GLARGINE 8 UNIT(S): 100 INJECTION, SOLUTION SUBCUTANEOUS at 22:44

## 2018-03-26 RX ADMIN — FAMOTIDINE 20 MILLIGRAM(S): 10 INJECTION INTRAVENOUS at 12:04

## 2018-03-26 RX ADMIN — PANTOPRAZOLE SODIUM 40 MILLIGRAM(S): 20 TABLET, DELAYED RELEASE ORAL at 00:13

## 2018-03-26 RX ADMIN — CLOPIDOGREL BISULFATE 75 MILLIGRAM(S): 75 TABLET, FILM COATED ORAL at 12:04

## 2018-03-26 RX ADMIN — SODIUM CHLORIDE 200 MILLILITER(S): 9 INJECTION INTRAMUSCULAR; INTRAVENOUS; SUBCUTANEOUS at 02:39

## 2018-03-26 RX ADMIN — Medication 2: at 12:00

## 2018-03-26 RX ADMIN — INSULIN GLARGINE 8 UNIT(S): 100 INJECTION, SOLUTION SUBCUTANEOUS at 02:35

## 2018-03-26 RX ADMIN — HEPARIN SODIUM 5000 UNIT(S): 5000 INJECTION INTRAVENOUS; SUBCUTANEOUS at 17:26

## 2018-03-26 RX ADMIN — Medication 100 MILLIGRAM(S): at 22:45

## 2018-03-26 RX ADMIN — Medication 40 MILLIGRAM(S): at 05:03

## 2018-03-26 RX ADMIN — Medication 3: at 08:04

## 2018-03-26 RX ADMIN — Medication 1 UNIT(S): at 02:19

## 2018-03-26 RX ADMIN — Medication 100 MILLIGRAM(S): at 05:03

## 2018-03-26 RX ADMIN — HEPARIN SODIUM 5000 UNIT(S): 5000 INJECTION INTRAVENOUS; SUBCUTANEOUS at 05:03

## 2018-03-26 RX ADMIN — Medication 10 MILLIGRAM(S): at 12:05

## 2018-03-26 NOTE — H&P ADULT - PROBLEM SELECTOR PLAN 1
--suspect related to compression fracture, and given history, likely sequelae of fall  --continue lidocaine patch  --currently with mild pain  --patient with nausea and vomiting in ED, likely sequelae of narcotic  --tylenol for pain PRN  --MRI L spine pending

## 2018-03-26 NOTE — H&P ADULT - PROBLEM SELECTOR PLAN 3
--unable to get collateral from patient or daughter regarding home meds  --last discharge list showed lantus 12U QHS.  WIll continue 8U QHS, ISS. Please reconcile medications with daughter in AM.    --holding oral glycemics  --Aqc  --DM/DASH diet

## 2018-03-26 NOTE — CONSULT NOTE ADULT - ASSESSMENT
89M s/p fall one month prior found to have L1 compression fx  - No acute neurosurgical intervention  - TLSO brace for comfort  - Pain control  - PT Eval  - Q4 hour neurochecks  - No further neurosurgical intervention anticipated at this time. Recommend follow up outpatient with Dr. Patterson 89M s/p fall two months prior found to have L1 compression fx  - No acute neurosurgical intervention  - TLSO brace for comfort  - Pain control  - PT Eval  - Q4 hour neurochecks  - No neurosurgical intervention anticipated at this time.

## 2018-03-26 NOTE — H&P ADULT - HISTORY OF PRESENT ILLNESS
This is an 89 year old gentleman with NIDDM, CAD s/p CABG 2006, stent 2011, GERD, HTN presenting with back and abdominal pain. Per pt the lbp has been going on for the last 1-2 week and is worse on the left side He cannot remember when or why the back pain started but today says its worse than ever. He denies incontinence, f/c, or focal extremity weakness. Re: his abdominal pain, he estimates it's been going on the last 2 months. He cannot pinpoint what he was doing when it started but claims its worse today than its ever been. It is not associated with nausea/vomiting/diarrhea, or f/c. Nothing makes it better or worse. No recent trauma. Denies recent falls or illness.    In ED, Tmax 97.9, HR , BP 84//78, satting well on RA. Labs notable for WBC 6.4, Hgb 11.5, Plt 189, Plt 5.5, BUN 49, Cr 1.39, , , troponin negative x 1, UA clean. Patient received IV tylenol, maalox, famotidine, lidocaine, pantoprazole, morphine 4mg. This is an 89 year old gentleman with NIDDM, CAD s/p CABG 2006, stent 2011, GERD, HTN presenting with back and abdominal pain. Patient notes that he slipped off of his toilet ~2 months ago onto his buttock. Since then, he notes intermittent lower back and lower abdominal pain which is 8/10 at its worst, crampy, non-radiating, associated with movement.  Does not correlate with eating or BMs, and patient notes normal bowel movements, enies diarrhea, constipation, incontinence or dysuria. Also denies fevers or chills, nausea, vomiting. He denies lower extremity weakness or paresthesias.  He denies ever getting a colonoscopy. He has not taken anything for this pain.     Notably, after receiving morphine in ED, patient became nauseated and had 1 episode of NBNB emesis. He received a GI cocktail and on my exam notes feeling well, denies N/V.       In ED, Tmax 97.9, HR , BP 84//78, satting well on RA. Labs notable for WBC 6.4, Hgb 11.5, Plt 189, Plt 5.5, BUN 49, Cr 1.39, , , troponin negative x 1, UA clean.  CT A/P showed circumferential wall thickening of the urinary bladder   without pericystic change, mild mucosal thickening of the rectum which may be indicative of proctitis, L1 vertebral body with associated vacuum disc phenomenon.    Patient received IV tylenol, maalox, famotidine, lidocaine, pantoprazole, morphine 4mg. This is an 89 year old gentleman with NIDDM, CAD s/p CABG 2006, stent 2011, GERD, HTN presenting with back and abdominal pain. Patient notes that he slipped off of his toilet ~2 months ago onto his buttock. Since then, he notes intermittent lower back and lower abdominal pain which is 8/10 at its worst, crampy, non-radiating, associated with movement.  Does not correlate with eating or BMs, and patient notes normal bowel movements, enies diarrhea, constipation, incontinence or dysuria. Also denies fevers or chills, nausea, vomiting. He denies lower extremity weakness or paresthesias.  He denies ever getting a colonoscopy. He has not taken anything for this pain.     Notably, after receiving morphine in ED, patient became nauseated and had 1 episode of NBNB emesis. He received a GI cocktail and on my exam notes feeling well, denies N/V.       In ED, Tmax 97.9, HR , BP 84//78, satting well on RA. Labs notable for WBC 6.4, Hgb 11.5, Plt 189, Plt 5.5, BUN 49, Cr 1.39, , , troponin negative x 1, UA clean.  CT A/P showed circumferential wall thickening of the urinary bladder without pericystic change, mild mucosal thickening of the rectum which may be indicative of proctitis, L1 vertebral body with associated vacuum disc phenomenon. Patient received IV tylenol, maalox, famotidine, lidocaine, pantoprazole, morphine 4mg.

## 2018-03-26 NOTE — H&P ADULT - PROBLEM SELECTOR PLAN 2
--finding of urinary bladder thickening, though no evidence of UTI on UA  --enlarged prostate, though patient does not endorse urinary symptoms, and Cr is improved from prior.  Will get bladder scan.

## 2018-03-26 NOTE — H&P ADULT - NSHPLABSRESULTS_GEN_ALL_CORE
Labs personally reviewed.                        11.5   6.4   )-----------( 189      ( 25 Mar 2018 15:52 )             34.3     03-25    138  |  102  |  49<H>  ----------------------------<  296<H>  5.5<H>   |  23  |  1.39<H>    Ca    9.1      25 Mar 2018 15:52    TPro  6.5  /  Alb  3.1<L>  /  TBili  0.5  /  DBili  x   /  AST  18  /  ALT  15  /  AlkPhos  151<H>  03-25    CARDIAC MARKERS ( 25 Mar 2018 15:52 )  x     / <0.01 ng/mL / x     / x     / x          LIVER FUNCTIONS - ( 25 Mar 2018 15:52 )  Alb: 3.1 g/dL / Pro: 6.5 g/dL / ALK PHOS: 151 U/L / ALT: 15 U/L RC / AST: 18 U/L / GGT: x             Urinalysis Basic - ( 25 Mar 2018 19:27 )    Color: Yellow / Appearance: Clear / SG: >1.030 / pH: x  Gluc: x / Ketone: Negative  / Bili: Negative / Urobili: Negative   Blood: x / Protein: 30 mg/dL / Nitrite: Negative   Leuk Esterase: Negative / RBC: 3-5 /HPF / WBC 0-2 /HPF   Sq Epi: x / Non Sq Epi: OCC /HPF / Bacteria: x      Imaging personally reviewed.      Tracing personally reviewed. Labs personally reviewed.                        11.5   6.4   )-----------( 189      ( 25 Mar 2018 15:52 )             34.3     03-25    138  |  102  |  49<H>  ----------------------------<  296<H>  5.5<H>   |  23  |  1.39<H>    Ca    9.1      25 Mar 2018 15:52    TPro  6.5  /  Alb  3.1<L>  /  TBili  0.5  /  DBili  x   /  AST  18  /  ALT  15  /  AlkPhos  151<H>  03-25    CARDIAC MARKERS ( 25 Mar 2018 15:52 )  x     / <0.01 ng/mL / x     / x     / x        LIVER FUNCTIONS - ( 25 Mar 2018 15:52 )  Alb: 3.1 g/dL / Pro: 6.5 g/dL / ALK PHOS: 151 U/L / ALT: 15 U/L RC / AST: 18 U/L / GGT: x         Urinalysis Basic - ( 25 Mar 2018 19:27 )    Color: Yellow / Appearance: Clear / SG: >1.030 / pH: x  Gluc: x / Ketone: Negative  / Bili: Negative / Urobili: Negative   Blood: x / Protein: 30 mg/dL / Nitrite: Negative   Leuk Esterase: Negative / RBC: 3-5 /HPF / WBC 0-2 /HPF   Sq Epi: x / Non Sq Epi: OCC /HPF / Bacteria: x    Imaging personally reviewed.      Tracing personally reviewed.

## 2018-03-26 NOTE — ED ADULT NURSE REASSESSMENT NOTE - NS ED NURSE REASSESS COMMENT FT1
Report to ARUNA Gallo in ED Holding. ARUNA Gallo made aware of MD Paget's clarification for orders for bedtime Lantus and Sliding scale insulin to be given at this time.

## 2018-03-26 NOTE — CONSULT NOTE ADULT - SUBJECTIVE AND OBJECTIVE BOX
p ([id number])     HPI:  This is an 89 year old gentleman with NIDDM, CAD s/p CABG 2006, stent 2011, GERD, HTN presenting with back and abdominal pain. Patient notes that he slipped off of his toilet ~2 months ago onto his buttock. Since then, he notes intermittent lower back and lower abdominal pain which is 8/10 at its worst, crampy, non-radiating, associated with movement.  Does not correlate with eating or BMs, and patient notes normal bowel movements, enies diarrhea, constipation, incontinence or dysuria. Also denies fevers or chills, nausea, vomiting. He denies lower extremity weakness or paresthesias.  He denies ever getting a colonoscopy. He has not taken anything for this pain.     Notably, after receiving morphine in ED, patient became nauseated and had 1 episode of NBNB emesis. He received a GI cocktail and on my exam notes feeling well, denies N/V.       In ED, Tmax 97.9, HR , BP 84//78, satting well on RA. Labs notable for WBC 6.4, Hgb 11.5, Plt 189, Plt 5.5, BUN 49, Cr 1.39, , , troponin negative x 1, UA clean.  CT A/P showed circumferential wall thickening of the urinary bladder without pericystic change, mild mucosal thickening of the rectum which may be indicative of proctitis, L1 vertebral body with associated vacuum disc phenomenon. Patient received IV tylenol, maalox, famotidine, lidocaine, pantoprazole, morphine 4mg. (26 Mar 2018 00:32)    Patient endorses mild back pain now, no weakness, numbness, saddle anesthesia, bowel / bladder incontinence.     PAST MEDICAL HISTORY   CAD (coronary artery disease)  HLD (hyperlipidemia)  GERD (gastroesophageal reflux disease)  SOB (shortness of breath)  Melanoma  DM type 2 (diabetes mellitus, type 2)  HTN (hypertension)    PAST SURGICAL HISTORY   Skin cancer  S/P tonsillectomy  S/P carotid endarterectomy  S/P CABG x 4        MEDICATIONS:  Antibiotics:    Neuro:  acetaminophen   Tablet. 650 milliGRAM(s) Oral every 6 hours PRN  ondansetron Injectable 4 milliGRAM(s) IV Push every 6 hours PRN    Anticoagulation:  clopidogrel Tablet 75 milliGRAM(s) Oral daily  heparin  Injectable 5000 Unit(s) SubCutaneous every 12 hours    Other:  dextrose 5%. 1000 milliLiter(s) IV Continuous <Continuous>  dextrose 50% Injectable 12.5 Gram(s) IV Push once  dextrose 50% Injectable 25 Gram(s) IV Push once  dextrose 50% Injectable 25 Gram(s) IV Push once  dextrose Gel 1 Dose(s) Oral once PRN  docusate sodium 100 milliGRAM(s) Oral three times a day  famotidine    Tablet 20 milliGRAM(s) Oral daily  furosemide    Tablet 40 milliGRAM(s) Oral daily  glucagon  Injectable 1 milliGRAM(s) IntraMuscular once PRN  insulin glargine Injectable (LANTUS) 8 Unit(s) SubCutaneous at bedtime  insulin lispro (HumaLOG) corrective regimen sliding scale   SubCutaneous three times a day before meals  insulin lispro (HumaLOG) corrective regimen sliding scale   SubCutaneous at bedtime  polyethylene glycol 3350 17 Gram(s) Oral daily PRN  senna 2 Tablet(s) Oral at bedtime PRN      SOCIAL HISTORY:   Occupation:   Marital Status:     FAMILY HISTORY:  No pertinent family history in first degree relatives      REVIEW OF SYSTEMS:  negative but for hpi  General:  Eyes:  ENT:  Cardiac:  Respiratory:  GI:  Musculoskeletal:   Skin:  Neurologic:   Psychiatric:     PHYSICAL EXAMINATION:   T(C): 36.7 (03-26-18 @ 10:00), Max: 36.9 (03-26-18 @ 02:09)  HR: 97 (03-26-18 @ 10:00) (92 - 110)  BP: 109/73 (03-26-18 @ 10:00) (84/56 - 116/79)  RR: 17 (03-26-18 @ 10:00) (15 - 17)  SpO2: 100% (03-26-18 @ 10:00) (99% - 100%)  Wt(kg): --Height (cm): 180.34 (03-26 @ 02:09)  Weight (kg): 96 (03-26 @ 02:09)    General Examination:     Neurologic Examination:           PHYSICAL EXAM:    Constitutional: No Acute Distress     Neurological: AOx3, Following Commands    Motor exam:          Upper extremity                         Delt     Bicep     Tricep    HG                                                 R         5/5        5/5        5/5       5/5                                               L          5/5        5/5        5/5       5/5          Lower extremity                        HF         KF        KE       DF         PF                                                  R        5/5        5/5        5/5       5/5         5/5                                               L         5/5        5/5       5/5       5/5          5/5                                                 Sensation: [x] intact to light touch  [] decreased:     No clonus, no hoffmans, downgoing babinski      LABS:                        10.7   6.77  )-----------( 153      ( 26 Mar 2018 06:50 )             33.5     03-26    137  |  104  |  53<H>  ----------------------------<  261<H>  5.4<H>   |  20<L>  |  1.26    Ca    8.8      26 Mar 2018 05:30    TPro  6.5  /  Alb  3.1<L>  /  TBili  0.5  /  DBili  x   /  AST  18  /  ALT  15  /  AlkPhos  151<H>  03-25      Urinalysis Basic - ( 25 Mar 2018 19:27 )    Color: Yellow / Appearance: Clear / SG: >1.030 / pH: x  Gluc: x / Ketone: Negative  / Bili: Negative / Urobili: Negative   Blood: x / Protein: 30 mg/dL / Nitrite: Negative   Leuk Esterase: Negative / RBC: 3-5 /HPF / WBC 0-2 /HPF   Sq Epi: x / Non Sq Epi: OCC /HPF / Bacteria: x        RADIOLOGY & ADDITIONAL STUDIES:

## 2018-03-26 NOTE — CONSULT NOTE ADULT - ATTENDING COMMENTS
I reviewed the resident's note and agree. The patient is an 89-year-old male s/p fall two months ago, found to have an L1 mild compression fracture without spinal cord compression. The patient is currently neurologically stable. No acute neurosurgical intervention is indicated at this time. Recommend pain control and physical therapy. Consider a brace when OOB PRN comfort. I saw and evaluated the patient. I reviewed the resident's note and agree. The patient is an 89-year-old male s/p fall two months ago, found to have an L1 mild compression fracture without spinal cord compression. The patient is currently neurologically stable. No acute neurosurgical intervention is indicated at this time. Recommend pain control and physical therapy. Consider a brace when OOB PRN comfort.

## 2018-03-26 NOTE — H&P ADULT - ASSESSMENT
This is an 89 year old gentleman with NIDDM, CAD s/p CABG 2006, stent 2011, GERD, HTN presenting with back and abdominal pain.

## 2018-03-26 NOTE — PROCEDURE NOTE - ADDITIONAL PROCEDURE DETAILS
Indication: PRE MRI  1) Presenting rhythm: Sinus rhythm and sinus tachy at  bpm  2) Measured data WNL, NL ICD function, Not PM dependent (Total V pace 0.3%, A pace: 3.1%)  3) Stored data revealed one NSVT episode on 1/29/18 lasting 7 beats  4) Changes made: MRI surescan turned on prior to MRI scan.  5) Call post-MRI to reprogram device. (Spectra: [id number])
Indication: Post MRI  1) Presenting rhythm: Sinus rhythm and sinus tach at  bpm  2) Measured data WNL, NL ICD function, Not PM dependent.  3) MRI SureScan turned off post MRI scan.     [id number]

## 2018-03-27 LAB
ANION GAP SERPL CALC-SCNC: 12 MMOL/L — SIGNIFICANT CHANGE UP (ref 5–17)
BUN SERPL-MCNC: 45 MG/DL — HIGH (ref 7–23)
CALCIUM SERPL-MCNC: 9.4 MG/DL — SIGNIFICANT CHANGE UP (ref 8.4–10.5)
CHLORIDE SERPL-SCNC: 105 MMOL/L — SIGNIFICANT CHANGE UP (ref 96–108)
CO2 SERPL-SCNC: 25 MMOL/L — SIGNIFICANT CHANGE UP (ref 22–31)
CREAT SERPL-MCNC: 1.42 MG/DL — HIGH (ref 0.5–1.3)
GLUCOSE SERPL-MCNC: 186 MG/DL — HIGH (ref 70–99)
HBA1C BLD-MCNC: 8.1 % — HIGH (ref 4–5.6)
MAGNESIUM SERPL-MCNC: 2.4 MG/DL — SIGNIFICANT CHANGE UP (ref 1.6–2.6)
POTASSIUM SERPL-MCNC: 4.5 MMOL/L — SIGNIFICANT CHANGE UP (ref 3.5–5.3)
POTASSIUM SERPL-SCNC: 4.5 MMOL/L — SIGNIFICANT CHANGE UP (ref 3.5–5.3)
SODIUM SERPL-SCNC: 142 MMOL/L — SIGNIFICANT CHANGE UP (ref 135–145)

## 2018-03-27 PROCEDURE — 93010 ELECTROCARDIOGRAM REPORT: CPT

## 2018-03-27 RX ORDER — TRAMADOL HYDROCHLORIDE 50 MG/1
25 TABLET ORAL EVERY 8 HOURS
Qty: 0 | Refills: 0 | Status: DISCONTINUED | OUTPATIENT
Start: 2018-03-27 | End: 2018-03-28

## 2018-03-27 RX ORDER — SODIUM CHLORIDE 9 MG/ML
1000 INJECTION INTRAMUSCULAR; INTRAVENOUS; SUBCUTANEOUS
Qty: 0 | Refills: 0 | Status: DISCONTINUED | OUTPATIENT
Start: 2018-03-27 | End: 2018-03-28

## 2018-03-27 RX ORDER — LIDOCAINE 4 G/100G
1 CREAM TOPICAL EVERY 24 HOURS
Qty: 0 | Refills: 0 | Status: DISCONTINUED | OUTPATIENT
Start: 2018-03-27 | End: 2018-03-28

## 2018-03-27 RX ADMIN — FAMOTIDINE 20 MILLIGRAM(S): 10 INJECTION INTRAVENOUS at 13:37

## 2018-03-27 RX ADMIN — LIDOCAINE 1 PATCH: 4 CREAM TOPICAL at 13:36

## 2018-03-27 RX ADMIN — HEPARIN SODIUM 5000 UNIT(S): 5000 INJECTION INTRAVENOUS; SUBCUTANEOUS at 05:06

## 2018-03-27 RX ADMIN — Medication 1: at 08:10

## 2018-03-27 RX ADMIN — Medication 1: at 13:32

## 2018-03-27 RX ADMIN — TRAMADOL HYDROCHLORIDE 25 MILLIGRAM(S): 50 TABLET ORAL at 14:10

## 2018-03-27 RX ADMIN — TRAMADOL HYDROCHLORIDE 25 MILLIGRAM(S): 50 TABLET ORAL at 23:03

## 2018-03-27 RX ADMIN — Medication 650 MILLIGRAM(S): at 05:06

## 2018-03-27 RX ADMIN — HEPARIN SODIUM 5000 UNIT(S): 5000 INJECTION INTRAVENOUS; SUBCUTANEOUS at 17:44

## 2018-03-27 RX ADMIN — Medication 100 MILLIGRAM(S): at 13:37

## 2018-03-27 RX ADMIN — Medication 100 MILLIGRAM(S): at 05:06

## 2018-03-27 RX ADMIN — TRAMADOL HYDROCHLORIDE 25 MILLIGRAM(S): 50 TABLET ORAL at 13:39

## 2018-03-27 RX ADMIN — Medication 100 MILLIGRAM(S): at 23:01

## 2018-03-27 RX ADMIN — Medication 40 MILLIGRAM(S): at 05:06

## 2018-03-27 RX ADMIN — Medication 2: at 17:44

## 2018-03-27 RX ADMIN — CLOPIDOGREL BISULFATE 75 MILLIGRAM(S): 75 TABLET, FILM COATED ORAL at 13:36

## 2018-03-27 RX ADMIN — SODIUM CHLORIDE 75 MILLILITER(S): 9 INJECTION INTRAMUSCULAR; INTRAVENOUS; SUBCUTANEOUS at 13:39

## 2018-03-27 RX ADMIN — INSULIN GLARGINE 8 UNIT(S): 100 INJECTION, SOLUTION SUBCUTANEOUS at 23:03

## 2018-03-27 RX ADMIN — Medication 650 MILLIGRAM(S): at 05:36

## 2018-03-27 NOTE — CHART NOTE - NSCHARTNOTEFT_GEN_A_CORE
Measure and deliver [place] custom fit LSO. Reviewed application skin precautions and care. Left bedside to be worn when out of bed. Written instruction and contact information left bedside. To notify office with any issues questions or concerns.  Andrey TIMMONS.  [facility]  [phone]

## 2018-03-27 NOTE — PHYSICAL THERAPY INITIAL EVALUATION ADULT - PERTINENT HX OF CURRENT PROBLEM, REHAB EVAL
89 year old gentleman with NIDDM, CAD s/p CABG 2006, stent 2011, GERD, HTN presenting with back and abdominal pain. Patient notes that he slipped off of his toilet ~2 months ago onto his buttock. Since then, he notes intermittent lower back and lower abdominal pain which is 8/10 at its worst, crampy, non-radiating, associated with movement. Pt found to have L1 compression fx.

## 2018-03-27 NOTE — PHYSICAL THERAPY INITIAL EVALUATION ADULT - ADDITIONAL COMMENTS
3/25 ABDOMEN/PELVIS; Circumferential wall thickening of the urinary bladder without pericystic change. To exclude cystitis, recommend correlation with urinalysis. This thickening may be related to a relative chronic bladder outlet obstruction from enlarged prostate gland.Enlarged prostate indents the base of the urinary bladder. Mild mucosal thickening of the rectum which may be indicative of proctitis. Please correlate. Cholelithiasis without evidence of cholecystitis.  3/26 MRI LUMBAR SPINE: Acute L1 vertebral body compression fracture with very minimal retropulsion of the posterior cortex. Findings are likely due to underlying osteoporosis and/or trauma. Follow-up to resolution is recommended. Subtle acute fracture of the superior endplate of T12 with developing Schmorl's node. Multilevel degenerative changes.

## 2018-03-27 NOTE — PHYSICAL THERAPY INITIAL EVALUATION ADULT - DIAGNOSIS, PT EVAL
decreased functional mobility secondary to generalized weakness, impaired balance, LBP increased with activity

## 2018-03-27 NOTE — PHYSICAL THERAPY INITIAL EVALUATION ADULT - CRITERIA FOR SKILLED THERAPEUTIC INTERVENTIONS
anticipated discharge recommendation/functional limitations in following categories/predicted duration of therapy intervention/therapy frequency/anticipated equipment needs at discharge/risk reduction/prevention/rehab potential/impairments found

## 2018-03-27 NOTE — PHYSICAL THERAPY INITIAL EVALUATION ADULT - TRANSFER TRAINING, PT EVAL
GOAL: Pt will perform ALL transfers independently w/use of appropriate assistive device as needed, in 2 weeks.

## 2018-03-27 NOTE — PHYSICAL THERAPY INITIAL EVALUATION ADULT - LIVES WITH, PROFILE
Pt reports he lives with his wife in apartment with no stairs to enter.  prior to admission pt ambulating with RW, requires assist from [facility]HA for ADL's./spouse

## 2018-03-28 ENCOUNTER — TRANSCRIPTION ENCOUNTER (OUTPATIENT)
Age: 83
End: 2018-03-28

## 2018-03-28 VITALS
SYSTOLIC BLOOD PRESSURE: 130 MMHG | OXYGEN SATURATION: 99 % | HEART RATE: 93 BPM | TEMPERATURE: 98 F | RESPIRATION RATE: 18 BRPM | DIASTOLIC BLOOD PRESSURE: 80 MMHG

## 2018-03-28 LAB
ANION GAP SERPL CALC-SCNC: 13 MMOL/L — SIGNIFICANT CHANGE UP (ref 5–17)
BUN SERPL-MCNC: 33 MG/DL — HIGH (ref 7–23)
CALCIUM SERPL-MCNC: 9.5 MG/DL — SIGNIFICANT CHANGE UP (ref 8.4–10.5)
CHLORIDE SERPL-SCNC: 103 MMOL/L — SIGNIFICANT CHANGE UP (ref 96–108)
CO2 SERPL-SCNC: 24 MMOL/L — SIGNIFICANT CHANGE UP (ref 22–31)
CREAT SERPL-MCNC: 1.41 MG/DL — HIGH (ref 0.5–1.3)
GLUCOSE SERPL-MCNC: 142 MG/DL — HIGH (ref 70–99)
POTASSIUM SERPL-MCNC: 4.5 MMOL/L — SIGNIFICANT CHANGE UP (ref 3.5–5.3)
POTASSIUM SERPL-SCNC: 4.5 MMOL/L — SIGNIFICANT CHANGE UP (ref 3.5–5.3)
SODIUM SERPL-SCNC: 140 MMOL/L — SIGNIFICANT CHANGE UP (ref 135–145)

## 2018-03-28 PROCEDURE — 93970 EXTREMITY STUDY: CPT

## 2018-03-28 PROCEDURE — 74177 CT ABD & PELVIS W/CONTRAST: CPT

## 2018-03-28 PROCEDURE — 84295 ASSAY OF SERUM SODIUM: CPT

## 2018-03-28 PROCEDURE — 97162 PT EVAL MOD COMPLEX 30 MIN: CPT

## 2018-03-28 PROCEDURE — 80053 COMPREHEN METABOLIC PANEL: CPT

## 2018-03-28 PROCEDURE — 71046 X-RAY EXAM CHEST 2 VIEWS: CPT

## 2018-03-28 PROCEDURE — 82435 ASSAY OF BLOOD CHLORIDE: CPT

## 2018-03-28 PROCEDURE — 93005 ELECTROCARDIOGRAM TRACING: CPT

## 2018-03-28 PROCEDURE — 82962 GLUCOSE BLOOD TEST: CPT

## 2018-03-28 PROCEDURE — 83036 HEMOGLOBIN GLYCOSYLATED A1C: CPT

## 2018-03-28 PROCEDURE — 84132 ASSAY OF SERUM POTASSIUM: CPT

## 2018-03-28 PROCEDURE — 85014 HEMATOCRIT: CPT

## 2018-03-28 PROCEDURE — 82803 BLOOD GASES ANY COMBINATION: CPT

## 2018-03-28 PROCEDURE — 97116 GAIT TRAINING THERAPY: CPT

## 2018-03-28 PROCEDURE — 82330 ASSAY OF CALCIUM: CPT

## 2018-03-28 PROCEDURE — 72148 MRI LUMBAR SPINE W/O DYE: CPT

## 2018-03-28 PROCEDURE — 87086 URINE CULTURE/COLONY COUNT: CPT

## 2018-03-28 PROCEDURE — 83605 ASSAY OF LACTIC ACID: CPT

## 2018-03-28 PROCEDURE — 81001 URINALYSIS AUTO W/SCOPE: CPT

## 2018-03-28 PROCEDURE — 96375 TX/PRO/DX INJ NEW DRUG ADDON: CPT

## 2018-03-28 PROCEDURE — 96374 THER/PROPH/DIAG INJ IV PUSH: CPT | Mod: XU

## 2018-03-28 PROCEDURE — 99285 EMERGENCY DEPT VISIT HI MDM: CPT | Mod: 25

## 2018-03-28 PROCEDURE — 85027 COMPLETE CBC AUTOMATED: CPT

## 2018-03-28 PROCEDURE — 82947 ASSAY GLUCOSE BLOOD QUANT: CPT

## 2018-03-28 PROCEDURE — 84484 ASSAY OF TROPONIN QUANT: CPT

## 2018-03-28 PROCEDURE — 97530 THERAPEUTIC ACTIVITIES: CPT

## 2018-03-28 PROCEDURE — 83690 ASSAY OF LIPASE: CPT

## 2018-03-28 PROCEDURE — 83735 ASSAY OF MAGNESIUM: CPT

## 2018-03-28 PROCEDURE — 80048 BASIC METABOLIC PNL TOTAL CA: CPT

## 2018-03-28 RX ORDER — SENNA PLUS 8.6 MG/1
1 TABLET ORAL
Qty: 0 | Refills: 0 | COMMUNITY
Start: 2018-03-28

## 2018-03-28 RX ORDER — FUROSEMIDE 40 MG
1 TABLET ORAL
Qty: 30 | Refills: 0 | OUTPATIENT
Start: 2018-03-28 | End: 2018-04-26

## 2018-03-28 RX ORDER — FUROSEMIDE 40 MG
1 TABLET ORAL
Qty: 0 | Refills: 0 | COMMUNITY
Start: 2018-03-28

## 2018-03-28 RX ORDER — TRAMADOL HYDROCHLORIDE 50 MG/1
0.5 TABLET ORAL
Qty: 0 | Refills: 0 | COMMUNITY
Start: 2018-03-28

## 2018-03-28 RX ORDER — ACETAMINOPHEN 500 MG
2 TABLET ORAL
Qty: 0 | Refills: 0 | COMMUNITY
Start: 2018-03-28

## 2018-03-28 RX ORDER — CARVEDILOL PHOSPHATE 80 MG/1
1 CAPSULE, EXTENDED RELEASE ORAL
Qty: 0 | Refills: 0 | COMMUNITY
Start: 2018-03-28

## 2018-03-28 RX ORDER — FUROSEMIDE 40 MG
1 TABLET ORAL
Qty: 0 | Refills: 0 | COMMUNITY
Start: 2018-03-28 | End: 2018-04-26

## 2018-03-28 RX ORDER — POLYETHYLENE GLYCOL 3350 17 G/17G
17 POWDER, FOR SOLUTION ORAL
Qty: 0 | Refills: 0 | COMMUNITY
Start: 2018-03-28

## 2018-03-28 RX ORDER — FAMOTIDINE 10 MG/ML
1 INJECTION INTRAVENOUS
Qty: 0 | Refills: 0 | COMMUNITY
Start: 2018-03-28

## 2018-03-28 RX ORDER — SENNA PLUS 8.6 MG/1
2 TABLET ORAL
Qty: 0 | Refills: 0 | COMMUNITY
Start: 2018-03-28

## 2018-03-28 RX ADMIN — TRAMADOL HYDROCHLORIDE 25 MILLIGRAM(S): 50 TABLET ORAL at 07:53

## 2018-03-28 RX ADMIN — LIDOCAINE 1 PATCH: 4 CREAM TOPICAL at 02:57

## 2018-03-28 RX ADMIN — TRAMADOL HYDROCHLORIDE 25 MILLIGRAM(S): 50 TABLET ORAL at 00:03

## 2018-03-28 RX ADMIN — Medication 100 MILLIGRAM(S): at 06:51

## 2018-03-28 RX ADMIN — FAMOTIDINE 20 MILLIGRAM(S): 10 INJECTION INTRAVENOUS at 11:14

## 2018-03-28 RX ADMIN — Medication 40 MILLIGRAM(S): at 06:52

## 2018-03-28 RX ADMIN — HEPARIN SODIUM 5000 UNIT(S): 5000 INJECTION INTRAVENOUS; SUBCUTANEOUS at 06:52

## 2018-03-28 RX ADMIN — TRAMADOL HYDROCHLORIDE 25 MILLIGRAM(S): 50 TABLET ORAL at 14:02

## 2018-03-28 RX ADMIN — LIDOCAINE 1 PATCH: 4 CREAM TOPICAL at 14:00

## 2018-03-28 RX ADMIN — Medication 5: at 11:19

## 2018-03-28 RX ADMIN — Medication 1: at 07:35

## 2018-03-28 RX ADMIN — Medication 100 MILLIGRAM(S): at 14:00

## 2018-03-28 RX ADMIN — CLOPIDOGREL BISULFATE 75 MILLIGRAM(S): 75 TABLET, FILM COATED ORAL at 11:14

## 2018-03-28 RX ADMIN — TRAMADOL HYDROCHLORIDE 25 MILLIGRAM(S): 50 TABLET ORAL at 14:30

## 2018-03-28 RX ADMIN — TRAMADOL HYDROCHLORIDE 25 MILLIGRAM(S): 50 TABLET ORAL at 06:53

## 2018-03-28 NOTE — DISCHARGE NOTE ADULT - MEDICATION SUMMARY - MEDICATIONS TO STOP TAKING
I will STOP taking the medications listed below when I get home from the hospital:    atorvastatin 80 mg oral tablet  -- 1 tab(s) by mouth once a day (at bedtime)    carvedilol 12.5 mg oral tablet  -- 1 tab(s) by mouth every 12 hours    furosemide 40 mg oral tablet  -- 1 tab(s) by mouth  M-W-F beginning 1/11/16  -- pt does not remember dose    Lidoderm 5% topical film  -- Apply on skin to affected area once a day. Remove prior to 24 hrs of use   -- For external use only.  Remove old patch prior to applying a new patch.    traMADol 50 mg oral tablet  -- 0.5 tab(s) by mouth every 8 hours    famotidine 20 mg oral tablet  -- 1 tab(s) by mouth once a day    polyethylene glycol 3350 oral powder for reconstitution  -- 17 gram(s) by mouth once a day, As needed, Constipation

## 2018-03-28 NOTE — DISCHARGE NOTE ADULT - PATIENT PORTAL LINK FT
You can access the [facility] Patient Portal, offered by [facility], by registering with the following website: [email or web address]

## 2018-03-28 NOTE — PROGRESS NOTE ADULT - PROBLEM SELECTOR PLAN 4
BP fluctuates but stable, add norvasc 5mg if remains elevated
BP fluctuates but stable, add norvasc 5mg if remains elevated

## 2018-03-28 NOTE — PROGRESS NOTE ADULT - PROBLEM SELECTOR PLAN 1
MRI shows L1 fx  appreciate neurosurg recs, no acute interventions  lidocaine patch and tramadol  PT ongoing
MRI shows L1 fx  appreciate neurosurg recs, no acute interventions  add lidocaine patch  add tramadol

## 2018-03-28 NOTE — PROGRESS NOTE ADULT - ASSESSMENT
89 year old gentleman with NIDDM, CAD s/p CABG 2006, stent 2011, GERD, HTN presenting with back and abdominal pain 2' acute L1 fx
89 year old gentleman with NIDDM, CAD s/p CABG 2006, stent 2011, GERD, HTN presenting with back and abdominal pain.

## 2018-03-28 NOTE — DISCHARGE NOTE ADULT - CARE PLAN
Principal Discharge DX:	Closed fracture of first lumbar vertebra, unspecified fracture morphology, initial encounter  Goal:	no pain  Assessment and plan of treatment:	Physical Therapy  Follow up with your physician  fall precautions  return to emergency for increased difficulty walking, inability to move extremity, numbness or tingling of extremity, inability to urinate  Secondary Diagnosis:	Acute midline low back pain without sciatica  Goal:	pain control  Assessment and plan of treatment:	take meds as prescribed  Secondary Diagnosis:	Essential hypertension  Goal:	/80  Assessment and plan of treatment:	Follow up with your physicians  Secondary Diagnosis:	Type 2 diabetes mellitus with other neurologic complication, with long-term current use of insulin  Goal:	HA1C<7  Assessment and plan of treatment:	Your HA1C was 8.1  take meds as prescribe and follow up with your physician  hypoglycemia prevention as discussed  Secondary Diagnosis:	Abdominal pain  Goal:	resolved  Assessment and plan of treatment:	follow up with your physician  monitor labs, BMP with your physician Principal Discharge DX:	Closed fracture of first lumbar vertebra, unspecified fracture morphology, initial encounter  Goal:	no pain  Assessment and plan of treatment:	Physical Therapy  Follow up with your physician  fall precautions  return to emergency for increased difficulty walking, inability to move extremity, numbness or tingling of extremity, inability to urinate  Secondary Diagnosis:	Acute midline low back pain without sciatica  Goal:	pain control  Assessment and plan of treatment:	take meds as prescribed  Secondary Diagnosis:	Essential hypertension  Goal:	/80  Assessment and plan of treatment:	Follow up with your physicians  lasix dose decreased from twice a day to once a day follow up elevated creatinine and dose with your physician in 1 week  Secondary Diagnosis:	Type 2 diabetes mellitus with other neurologic complication, with long-term current use of insulin  Goal:	HA1C<7  Assessment and plan of treatment:	Your HA1C was 8.1  take meds as prescribe and follow up with your physician  hypoglycemia prevention as discussed  Secondary Diagnosis:	Abdominal pain  Goal:	resolved  Assessment and plan of treatment:	follow up with your physician  monitor labs, BMP with your physician

## 2018-03-28 NOTE — DISCHARGE NOTE ADULT - HOSPITAL COURSE
89 year old gentleman with NIDDM, CAD s/p CABG 2006, stent 2011, GERD, HTN presenting with back and abdominal pain secondary to acute L1 fx     Problem/Plan - 1:    Problem: Back pain.  Plan: MRI shows L1 fx  appreciate neurosurgery : no acute interventions  lidocaine patch and tramadol  PT .      Problem/Plan - 2:  ·  Problem: Abdominal pain.  Plan: stable  finding of urinary bladder thickening, though no evidence of UTI on UA  enlarged prostate, though patient does not endorse urinary symptoms, and Cr is improved from prior.      Problem/Plan - 3:  ·  Problem: DM type 2 (diabetes mellitus, type 2).  Plan: cont lantus 12U QHS.  WIll continue 8U QHS, ISS.      Problem/Plan - 4:  ·  Problem: HTN (hypertension).  Plan: BP fluctuates but stable, add norvasc 5mg if remains elevated.      Problem/Plan - 5:  ·  Problem: CAD (coronary artery disease).  Plan: will contiinue ASA plavix  Problem/Plan - 6 MONISHA s/p IVF's 89 year old gentleman with NIDDM, CAD s/p CABG 2006, stent 2011, GERD, HTN presenting with back and abdominal pain secondary to acute L1 fx     Problem/Plan - 1:    Problem: Back pain.  Plan: MRI shows L1 fx  appreciate neurosurgery : no acute interventions  lidocaine patch and tramadol, home PT     Problem/Plan - 2:  ·  Problem: Abdominal pain.  Plan: stable  finding of urinary bladder thickening, though no evidence of UTI on UA  enlarged prostate, though patient does not endorse urinary symptoms, and Cr is improved from prior.      Problem/Plan - 3:  ·  Problem: DM type 2 (diabetes mellitus, type 2).  Plan: cont Lantus 12U QHS.  WIll continue 8U QHS, ISS.      Problem/Plan - 4:  ·  Problem: HTN (hypertension).  Plan: BP fluctuates but stable, add norvasc 5mg if remains elevated.      Problem/Plan - 5:  ·  Problem: CAD (coronary artery disease).  Plan: will Continue ASA, Plavix    Problem/Plan - 6 MONISHA s/p IVF's. Follow up BMP with outpatient physician 89 year old gentleman with NIDDM, CAD s/p CABG 2006, stent 2011, GERD, HTN presenting with back and abdominal pain secondary to acute L1 fx     Problem/Plan - 1:    Problem: Back pain.  Plan: MRI shows L1 fx  appreciate neurosurgery : no acute interventions  lidocaine patch and tramadol, home PT     Problem/Plan - 2:  ·  Problem: Abdominal pain.  Plan: stable  finding of urinary bladder thickening, though no evidence of UTI on UA  enlarged prostate, though patient does not endorse urinary symptoms, and Cr is improved from prior.      Problem/Plan - 3:  ·  Problem: DM type 2 (diabetes mellitus, type 2).  Plan: cont Lantus 12U QHS.  WIll continue 8U QHS, ISS.      Problem/Plan - 4:  ·  Problem: HTN (hypertension).  Plan: BP fluctuates but stable, add norvasc 5mg if remains elevated.      Problem/Plan - 5:  ·  Problem: CAD (coronary artery disease).  Plan: will Continue ASA, Plavix    Problem/Plan - 6 MONISHA s/p IVF's.     Pt stable  discharged home with follow ups with PMD, Cardiologist and repeat BMP as an outpatient.

## 2018-03-28 NOTE — DISCHARGE NOTE ADULT - MEDICATION SUMMARY - MEDICATIONS TO TAKE
I will START or STAY ON the medications listed below when I get home from the hospital:    acetaminophen 325 mg oral tablet  -- 2 tab(s) by mouth every 6 hours, As needed, Mild Pain (1 - 3)  -- Indication: For Pain    aspirin 81 mg oral delayed release tablet  -- 1 tab(s) by mouth once a day  -- Indication: For CAD (coronary artery disease)    Neurontin 100 mg oral capsule  -- orally 2 times a day  -- Indication: For Pain    Neurontin 100 mg oral capsule  -- 200 milligram(s) by mouth once a day (at bedtime)  -- Indication: For Pain    Lantus Solostar Pen  -- 12 unit(s) subcutaneous once a day  -- Indication: For Type 2 diabetes mellitus with other neurologic complication, with long-term current use of insulin    sAXagliptin 2.5 mg oral tablet  -- 1 tab(s) by mouth once a day  -- Indication: For Type 2 diabetes mellitus with other neurologic complication, with long-term current use of insulin    Prandin 1 mg oral tablet  -- 1 tab(s) by mouth 3 times a day (before meals)  -- Indication: For Type 2 diabetes mellitus with other neurologic complication, with long-term current use of insulin    atorvastatin 20 mg oral tablet  -- 1 tab(s) by mouth once a day  -- Indication: For Hyperlipidemia    clopidogrel 75 mg oral tablet  -- 1 tab(s) by mouth once a day  -- Indication: For CAD (coronary artery disease)    carvedilol 6.25 mg oral tablet  -- 1 tab(s) by mouth 2 times a day  -- Indication: For HTN (hypertension)    furosemide 40 mg oral tablet  -- 1 tab(s) by mouth once a day  -- Indication: For Essential hypertension    senna oral tablet  -- 2 tab(s) by mouth once a day (at bedtime), As needed, Constipation  -- Indication: For Constipation

## 2018-03-28 NOTE — PROGRESS NOTE ADULT - PROBLEM SELECTOR PLAN 2
stable  finding of urinary bladder thickening, though no evidence of UTI on UA  enlarged prostate, though patient does not endorse urinary symptoms, and Cr is improved from prior.
stable  finding of urinary bladder thickening, though no evidence of UTI on UA  enlarged prostate, though patient does not endorse urinary symptoms, and Cr is improved from prior.

## 2018-03-28 NOTE — PROGRESS NOTE ADULT - PROBLEM SELECTOR PLAN 5
will contiinue ASA plavix    HSQ dvt ppx    dispo: plan of care dw daughter Jena over the phone, pending PT eval
will contiinue ASA plavix    HSQ dvt ppx    dispo: pending PT report

## 2018-03-28 NOTE — DISCHARGE NOTE ADULT - SECONDARY DIAGNOSIS.
Acute midline low back pain without sciatica Essential hypertension Type 2 diabetes mellitus with other neurologic complication, with long-term current use of insulin Abdominal pain

## 2018-03-28 NOTE — DISCHARGE NOTE ADULT - CONDITIONS AT DISCHARGE
Pt is a&o x4 with periods of confusion. Pt denies any pain. Pt. & pt daughter verbalized understanding of discharge instructions. Pt is ambulatory, voiding, tolerating diet, and VSS. Pt. & pt daughter verbalized understanding of medications prescribed and to follow up with MD in time ordered. IV lock removed and site WDL. Safety maintained. Pt being transported to [facility] via wheel chair by transport team.

## 2018-03-28 NOTE — DISCHARGE NOTE ADULT - CARE PROVIDER_API CALL
Nahun Negron), Internal Medicine; Nephrology  [address] [place] [address]  Phone: [phone]  Fax: [phone]    Emmett Galarza), Cardiovascular Disease; Internal Medicine  [address] [place] [address]  Phone: [phone]  Fax: [phone] Nahun Negron), Internal Medicine; Nephrology  [address] [place] [address]  Phone: [phone]  Fax: [phone]    Jermaine Santos), Cardiovascular Disease; Internal Medicine  [address] [place] [address]  Phone: [phone]  Fax: [phone]

## 2018-03-28 NOTE — DISCHARGE NOTE ADULT - PLAN OF CARE
no pain Physical Therapy  Follow up with your physician  fall precautions  return to emergency for increased difficulty walking, inability to move extremity, numbness or tingling of extremity, inability to urinate pain control take meds as prescribed /80 Follow up with your physicians HA1C<7 Your HA1C was 8.1  take meds as prescribe and follow up with your physician  hypoglycemia prevention as discussed resolved follow up with your physician  monitor labs, BMP with your physician Follow up with your physicians  lasix dose decreased from twice a day to once a day follow up elevated creatinine and dose with your physician in 1 week

## 2018-03-28 NOTE — PROGRESS NOTE ADULT - SUBJECTIVE AND OBJECTIVE BOX
He ambulated with PT, pain was not increased but felt a little winded afterwards    Vital Signs Last 24 Hrs  T(C): 36.8 (28 Mar 2018 10:00), Max: 36.8 (27 Mar 2018 13:24)  T(F): 98.3 (28 Mar 2018 10:00), Max: 98.3 (27 Mar 2018 13:24)  HR: 80 (28 Mar 2018 10:00) (72 - 87)  BP: 151/89 (28 Mar 2018 10:00) (126/74 - 151/89)  BP(mean): --  RR: 18 (28 Mar 2018 10:00) (16 - 18)  SpO2: 99% (28 Mar 2018 10:00) (94% - 99%)    GENERAL: NAD, AAOx2  HEAD:  Atraumatic, Normocephalic  EYES: EOMI  NECK: Supple, No JVD  CHEST/LUNG: CTABL, No wheeze  HEART: Regular rate and rhythm; no murmur  ABDOMEN: Soft, Nontender, Nondistended; Bowel sounds present  EXTREMITIES:  2+ Peripheral Pulses, No LE edema b/l  SKIN: No rashes or lesions  NEURO: No focal deficits, 5/5, FROM    LABS:    03-27    142  |  105  |  45<H>  ----------------------------<  186<H>  4.5   |  25  |  1.42<H>    Ca    9.4      27 Mar 2018 07:09  Mg     2.4     03-27        CAPILLARY BLOOD GLUCOSE      POCT Blood Glucose.: 167 mg/dL (28 Mar 2018 07:32)  POCT Blood Glucose.: 164 mg/dL (27 Mar 2018 21:36)  POCT Blood Glucose.: 215 mg/dL (27 Mar 2018 16:46)  POCT Blood Glucose.: 192 mg/dL (27 Mar 2018 12:49)  POCT Blood Glucose.: 199 mg/dL (27 Mar 2018 11:42)
Patient is a 89y old  Male who presents with a chief complaint of     SUBJECTIVE / OVERNIGHT EVENTS:    Patient seen and examined. denies pain when sitting in bed. brace at bedside. denies cp sob.      Vital Signs Last 24 Hrs  T(C): 36.3 (27 Mar 2018 04:58), Max: 37 (26 Mar 2018 17:30)  T(F): 97.4 (27 Mar 2018 04:58), Max: 98.6 (26 Mar 2018 17:30)  HR: 80 (27 Mar 2018 06:00) (80 - 105)  BP: 114/70 (27 Mar 2018 06:00) (114/70 - 163/97)  BP(mean): --  RR: 20 (27 Mar 2018 04:58) (16 - 20)  SpO2: 99% (27 Mar 2018 04:58) (96% - 99%)  I&O's Summary    26 Mar 2018 07:01  -  27 Mar 2018 07:00  --------------------------------------------------------  IN: 1340 mL / OUT: 1350 mL / NET: -10 mL        PE:  GENERAL: NAD, AAOx2  HEAD:  Atraumatic, Normocephalic  EYES: EOMI, PERRLA, conjunctiva and sclera clear  NECK: Supple, No JVD  CHEST/LUNG: CTABL, No wheeze  HEART: Regular rate and rhythm; no murmur  ABDOMEN: Soft, Nontender, Nondistended; Bowel sounds present  EXTREMITIES:  2+ Peripheral Pulses, No clubbing, cyanosis, or edema  SKIN: No rashes or lesions  NEURO: No focal deficits, 5/5, FROM    LABS:                        10.7   6.77  )-----------( 153      ( 26 Mar 2018 06:50 )             33.5     03-27    142  |  105  |  45<H>  ----------------------------<  186<H>  4.5   |  25  |  1.42<H>    Ca    9.4      27 Mar 2018 07:09  Mg     2.4     03-27    TPro  6.5  /  Alb  3.1<L>  /  TBili  0.5  /  DBili  x   /  AST  18  /  ALT  15  /  AlkPhos  151<H>  03-25      CAPILLARY BLOOD GLUCOSE      POCT Blood Glucose.: 189 mg/dL (27 Mar 2018 07:53)  POCT Blood Glucose.: 207 mg/dL (26 Mar 2018 21:54)  POCT Blood Glucose.: 216 mg/dL (26 Mar 2018 17:15)  POCT Blood Glucose.: 228 mg/dL (26 Mar 2018 11:51)    CARDIAC MARKERS ( 25 Mar 2018 15:52 )  x     / <0.01 ng/mL / x     / x     / x          Urinalysis Basic - ( 25 Mar 2018 19:27 )    Color: Yellow / Appearance: Clear / SG: >1.030 / pH: x  Gluc: x / Ketone: Negative  / Bili: Negative / Urobili: Negative   Blood: x / Protein: 30 mg/dL / Nitrite: Negative   Leuk Esterase: Negative / RBC: 3-5 /HPF / WBC 0-2 /HPF   Sq Epi: x / Non Sq Epi: OCC /HPF / Bacteria: x        RADIOLOGY & ADDITIONAL TESTS:    Imaging Personally Reviewed:  [x] YES  [ ] NO    Consultant(s) Notes Reviewed:  [x] YES  [ ] NO    MEDICATIONS  (STANDING):  clopidogrel Tablet 75 milliGRAM(s) Oral daily  dextrose 5%. 1000 milliLiter(s) (50 mL/Hr) IV Continuous <Continuous>  dextrose 50% Injectable 12.5 Gram(s) IV Push once  dextrose 50% Injectable 25 Gram(s) IV Push once  dextrose 50% Injectable 25 Gram(s) IV Push once  docusate sodium 100 milliGRAM(s) Oral three times a day  famotidine    Tablet 20 milliGRAM(s) Oral daily  furosemide    Tablet 40 milliGRAM(s) Oral daily  heparin  Injectable 5000 Unit(s) SubCutaneous every 12 hours  insulin glargine Injectable (LANTUS) 8 Unit(s) SubCutaneous at bedtime  insulin lispro (HumaLOG) corrective regimen sliding scale   SubCutaneous three times a day before meals  insulin lispro (HumaLOG) corrective regimen sliding scale   SubCutaneous at bedtime  sodium chloride 0.9%. 1000 milliLiter(s) (75 mL/Hr) IV Continuous <Continuous>  traMADol 25 milliGRAM(s) Oral every 8 hours    MEDICATIONS  (PRN):  acetaminophen   Tablet. 650 milliGRAM(s) Oral every 6 hours PRN Mild Pain (1 - 3)  dextrose Gel 1 Dose(s) Oral once PRN Blood Glucose LESS THAN 70 milliGRAM(s)/deciliter  glucagon  Injectable 1 milliGRAM(s) IntraMuscular once PRN Glucose LESS THAN 70 milligrams/deciliter  ondansetron Injectable 4 milliGRAM(s) IV Push every 6 hours PRN Nausea  polyethylene glycol 3350 17 Gram(s) Oral daily PRN Constipation  senna 2 Tablet(s) Oral at bedtime PRN Constipation      Care Discussed with Consultants/Other Providers [x] YES  [ ] NO    HEALTH ISSUES - PROBLEM Dx:  Nutrition, metabolism, and development symptoms: Nutrition, metabolism, and development symptoms  Prophylactic measure: Prophylactic measure  CAD (coronary artery disease): CAD (coronary artery disease)  HTN (hypertension): HTN (hypertension)  DM type 2 (diabetes mellitus, type 2): DM type 2 (diabetes mellitus, type 2)  Abdominal pain: Abdominal pain  Back pain: Back pain

## 2018-03-28 NOTE — DISCHARGE NOTE ADULT - ADDITIONAL INSTRUCTIONS
Follow up with your Cardiologist and PMD as an outpatient.  Repeat BMP within 1 week of discharge to assess Cr level

## 2018-04-21 ENCOUNTER — INPATIENT (INPATIENT)
Facility: HOSPITAL | Age: 83
LOS: 2 days | Discharge: HOME | DRG: 552 | End: 2018-04-24
Attending: INTERNAL MEDICINE | Admitting: INTERNAL MEDICINE
Payer: MEDICARE

## 2018-04-21 VITALS
WEIGHT: 212.97 LBS | RESPIRATION RATE: 16 BRPM | DIASTOLIC BLOOD PRESSURE: 75 MMHG | SYSTOLIC BLOOD PRESSURE: 110 MMHG | HEART RATE: 98 BPM | OXYGEN SATURATION: 100 % | TEMPERATURE: 98 F

## 2018-04-21 DIAGNOSIS — M79.1 MYALGIA: ICD-10-CM

## 2018-04-21 DIAGNOSIS — I25.810 ATHEROSCLEROSIS OF CORONARY ARTERY BYPASS GRAFT(S) WITHOUT ANGINA PECTORIS: ICD-10-CM

## 2018-04-21 DIAGNOSIS — Z95.1 PRESENCE OF AORTOCORONARY BYPASS GRAFT: Chronic | ICD-10-CM

## 2018-04-21 DIAGNOSIS — I10 ESSENTIAL (PRIMARY) HYPERTENSION: ICD-10-CM

## 2018-04-21 DIAGNOSIS — E11.9 TYPE 2 DIABETES MELLITUS WITHOUT COMPLICATIONS: ICD-10-CM
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
## 2018-04-21 LAB
ALBUMIN SERPL ELPH-MCNC: 3.5 G/DL — SIGNIFICANT CHANGE UP (ref 3.3–5)
ALP SERPL-CCNC: 183 U/L — HIGH (ref 40–120)
ALT FLD-CCNC: 21 U/L — SIGNIFICANT CHANGE UP (ref 10–45)
ANION GAP SERPL CALC-SCNC: 13 MMOL/L — SIGNIFICANT CHANGE UP (ref 5–17)
APPEARANCE UR: CLEAR — SIGNIFICANT CHANGE UP
AST SERPL-CCNC: 18 U/L — SIGNIFICANT CHANGE UP (ref 10–40)
BASOPHILS # BLD AUTO: 0 K/UL — SIGNIFICANT CHANGE UP (ref 0–0.2)
BASOPHILS NFR BLD AUTO: 0.1 % — SIGNIFICANT CHANGE UP (ref 0–2)
BILIRUB SERPL-MCNC: 0.4 MG/DL — SIGNIFICANT CHANGE UP (ref 0.2–1.2)
BILIRUB UR-MCNC: NEGATIVE — SIGNIFICANT CHANGE UP
BUN SERPL-MCNC: 32 MG/DL — HIGH (ref 7–23)
CALCIUM SERPL-MCNC: 9.5 MG/DL — SIGNIFICANT CHANGE UP (ref 8.4–10.5)
CHLORIDE SERPL-SCNC: 101 MMOL/L — SIGNIFICANT CHANGE UP (ref 96–108)
CO2 SERPL-SCNC: 25 MMOL/L — SIGNIFICANT CHANGE UP (ref 22–31)
COLOR SPEC: YELLOW — SIGNIFICANT CHANGE UP
CREAT SERPL-MCNC: 1.68 MG/DL — HIGH (ref 0.5–1.3)
DIFF PNL FLD: NEGATIVE — SIGNIFICANT CHANGE UP
EOSINOPHIL # BLD AUTO: 0.1 K/UL — SIGNIFICANT CHANGE UP (ref 0–0.5)
EOSINOPHIL NFR BLD AUTO: 1.7 % — SIGNIFICANT CHANGE UP (ref 0–6)
GLUCOSE BLDC GLUCOMTR-MCNC: 129 MG/DL — HIGH (ref 70–99)
GLUCOSE BLDC GLUCOMTR-MCNC: 140 MG/DL — HIGH (ref 70–99)
GLUCOSE SERPL-MCNC: 193 MG/DL — HIGH (ref 70–99)
GLUCOSE UR QL: NEGATIVE — SIGNIFICANT CHANGE UP
HCT VFR BLD CALC: 35.9 % — LOW (ref 39–50)
HGB BLD-MCNC: 11.9 G/DL — LOW (ref 13–17)
KETONES UR-MCNC: NEGATIVE — SIGNIFICANT CHANGE UP
LEUKOCYTE ESTERASE UR-ACNC: NEGATIVE — SIGNIFICANT CHANGE UP
LYMPHOCYTES # BLD AUTO: 1.4 K/UL — SIGNIFICANT CHANGE UP (ref 1–3.3)
LYMPHOCYTES # BLD AUTO: 19.1 % — SIGNIFICANT CHANGE UP (ref 13–44)
MCHC RBC-ENTMCNC: 30.4 PG — SIGNIFICANT CHANGE UP (ref 27–34)
MCHC RBC-ENTMCNC: 33.2 GM/DL — SIGNIFICANT CHANGE UP (ref 32–36)
MCV RBC AUTO: 91.6 FL — SIGNIFICANT CHANGE UP (ref 80–100)
MONOCYTES # BLD AUTO: 0.7 K/UL — SIGNIFICANT CHANGE UP (ref 0–0.9)
MONOCYTES NFR BLD AUTO: 8.6 % — SIGNIFICANT CHANGE UP (ref 2–14)
NEUTROPHILS # BLD AUTO: 5.3 K/UL — SIGNIFICANT CHANGE UP (ref 1.8–7.4)
NEUTROPHILS NFR BLD AUTO: 70.4 % — SIGNIFICANT CHANGE UP (ref 43–77)
NITRITE UR-MCNC: NEGATIVE — SIGNIFICANT CHANGE UP
NT-PROBNP SERPL-SCNC: 1890 PG/ML — HIGH (ref 0–300)
PH UR: 6 — SIGNIFICANT CHANGE UP (ref 5–8)
PLATELET # BLD AUTO: 202 K/UL — SIGNIFICANT CHANGE UP (ref 150–400)
POTASSIUM SERPL-MCNC: 4.5 MMOL/L — SIGNIFICANT CHANGE UP (ref 3.5–5.3)
POTASSIUM SERPL-SCNC: 4.5 MMOL/L — SIGNIFICANT CHANGE UP (ref 3.5–5.3)
PROT SERPL-MCNC: 7.1 G/DL — SIGNIFICANT CHANGE UP (ref 6–8.3)
PROT UR-MCNC: SIGNIFICANT CHANGE UP
RBC # BLD: 3.92 M/UL — LOW (ref 4.2–5.8)
RBC # FLD: 14.3 % — SIGNIFICANT CHANGE UP (ref 10.3–14.5)
SODIUM SERPL-SCNC: 139 MMOL/L — SIGNIFICANT CHANGE UP (ref 135–145)
SP GR SPEC: >1.03 — HIGH (ref 1.01–1.02)
UROBILINOGEN FLD QL: NEGATIVE — SIGNIFICANT CHANGE UP
WBC # BLD: 7.5 K/UL — SIGNIFICANT CHANGE UP (ref 3.8–10.5)
WBC # FLD AUTO: 7.5 K/UL — SIGNIFICANT CHANGE UP (ref 3.8–10.5)

## 2018-04-21 PROCEDURE — 99285 EMERGENCY DEPT VISIT HI MDM: CPT | Mod: GC

## 2018-04-21 PROCEDURE — 74177 CT ABD & PELVIS W/CONTRAST: CPT | Mod: 26

## 2018-04-21 PROCEDURE — 72125 CT NECK SPINE W/O DYE: CPT | Mod: 26

## 2018-04-21 PROCEDURE — 71045 X-RAY EXAM CHEST 1 VIEW: CPT | Mod: 26

## 2018-04-21 PROCEDURE — 73502 X-RAY EXAM HIP UNI 2-3 VIEWS: CPT | Mod: 26,LT

## 2018-04-21 PROCEDURE — 70450 CT HEAD/BRAIN W/O DYE: CPT | Mod: 26

## 2018-04-21 PROCEDURE — 72170 X-RAY EXAM OF PELVIS: CPT | Mod: 26,59

## 2018-04-21 RX ORDER — HEPARIN SODIUM 5000 [USP'U]/ML
5000 INJECTION INTRAVENOUS; SUBCUTANEOUS EVERY 12 HOURS
Qty: 0 | Refills: 0 | Status: DISCONTINUED | OUTPATIENT
Start: 2018-04-21 | End: 2018-04-24

## 2018-04-21 RX ORDER — INSULIN GLARGINE 100 [IU]/ML
12 INJECTION, SOLUTION SUBCUTANEOUS ONCE
Qty: 0 | Refills: 0 | Status: COMPLETED | OUTPATIENT
Start: 2018-04-21 | End: 2018-04-21

## 2018-04-21 RX ORDER — INSULIN LISPRO 100/ML
VIAL (ML) SUBCUTANEOUS
Qty: 0 | Refills: 0 | Status: DISCONTINUED | OUTPATIENT
Start: 2018-04-21 | End: 2018-04-24

## 2018-04-21 RX ORDER — ASPIRIN/CALCIUM CARB/MAGNESIUM 324 MG
81 TABLET ORAL DAILY
Qty: 0 | Refills: 0 | Status: DISCONTINUED | OUTPATIENT
Start: 2018-04-21 | End: 2018-04-24

## 2018-04-21 RX ORDER — FUROSEMIDE 40 MG
40 TABLET ORAL
Qty: 0 | Refills: 0 | Status: DISCONTINUED | OUTPATIENT
Start: 2018-04-21 | End: 2018-04-24

## 2018-04-21 RX ORDER — GABAPENTIN 400 MG/1
200 CAPSULE ORAL
Qty: 0 | Refills: 0 | COMMUNITY

## 2018-04-21 RX ORDER — INSULIN GLARGINE 100 [IU]/ML
10 INJECTION, SOLUTION SUBCUTANEOUS AT BEDTIME
Qty: 0 | Refills: 0 | Status: DISCONTINUED | OUTPATIENT
Start: 2018-04-21 | End: 2018-04-24

## 2018-04-21 RX ORDER — SODIUM CHLORIDE 9 MG/ML
1000 INJECTION INTRAMUSCULAR; INTRAVENOUS; SUBCUTANEOUS ONCE
Qty: 0 | Refills: 0 | Status: COMPLETED | OUTPATIENT
Start: 2018-04-21 | End: 2018-04-21

## 2018-04-21 RX ORDER — ACETAMINOPHEN 500 MG
1000 TABLET ORAL ONCE
Qty: 0 | Refills: 0 | Status: COMPLETED | OUTPATIENT
Start: 2018-04-21 | End: 2018-04-21

## 2018-04-21 RX ORDER — DEXTROSE 50 % IN WATER 50 %
12.5 SYRINGE (ML) INTRAVENOUS ONCE
Qty: 0 | Refills: 0 | Status: DISCONTINUED | OUTPATIENT
Start: 2018-04-21 | End: 2018-04-24

## 2018-04-21 RX ORDER — SENNA PLUS 8.6 MG/1
1 TABLET ORAL AT BEDTIME
Qty: 0 | Refills: 0 | Status: DISCONTINUED | OUTPATIENT
Start: 2018-04-21 | End: 2018-04-22

## 2018-04-21 RX ORDER — GABAPENTIN 400 MG/1
100 CAPSULE ORAL
Qty: 0 | Refills: 0 | Status: DISCONTINUED | OUTPATIENT
Start: 2018-04-21 | End: 2018-04-24

## 2018-04-21 RX ORDER — GABAPENTIN 400 MG/1
0 CAPSULE ORAL
Qty: 0 | Refills: 0 | COMMUNITY

## 2018-04-21 RX ORDER — ACETAMINOPHEN 500 MG
650 TABLET ORAL EVERY 6 HOURS
Qty: 0 | Refills: 0 | Status: DISCONTINUED | OUTPATIENT
Start: 2018-04-21 | End: 2018-04-24

## 2018-04-21 RX ORDER — GLUCAGON INJECTION, SOLUTION 0.5 MG/.1ML
1 INJECTION, SOLUTION SUBCUTANEOUS ONCE
Qty: 0 | Refills: 0 | Status: DISCONTINUED | OUTPATIENT
Start: 2018-04-21 | End: 2018-04-24

## 2018-04-21 RX ORDER — CLOPIDOGREL BISULFATE 75 MG/1
75 TABLET, FILM COATED ORAL DAILY
Qty: 0 | Refills: 0 | Status: DISCONTINUED | OUTPATIENT
Start: 2018-04-21 | End: 2018-04-24

## 2018-04-21 RX ORDER — DEXTROSE 50 % IN WATER 50 %
1 SYRINGE (ML) INTRAVENOUS ONCE
Qty: 0 | Refills: 0 | Status: DISCONTINUED | OUTPATIENT
Start: 2018-04-21 | End: 2018-04-24

## 2018-04-21 RX ORDER — REPAGLINIDE 1 MG/1
1 TABLET ORAL
Qty: 0 | Refills: 0 | COMMUNITY

## 2018-04-21 RX ORDER — CARVEDILOL PHOSPHATE 80 MG/1
6.25 CAPSULE, EXTENDED RELEASE ORAL EVERY 12 HOURS
Qty: 0 | Refills: 0 | Status: DISCONTINUED | OUTPATIENT
Start: 2018-04-21 | End: 2018-04-24

## 2018-04-21 RX ORDER — ATORVASTATIN CALCIUM 80 MG/1
80 TABLET, FILM COATED ORAL AT BEDTIME
Qty: 0 | Refills: 0 | Status: DISCONTINUED | OUTPATIENT
Start: 2018-04-21 | End: 2018-04-24

## 2018-04-21 RX ORDER — SODIUM CHLORIDE 9 MG/ML
1000 INJECTION, SOLUTION INTRAVENOUS
Qty: 0 | Refills: 0 | Status: DISCONTINUED | OUTPATIENT
Start: 2018-04-21 | End: 2018-04-24

## 2018-04-21 RX ADMIN — Medication 400 MILLIGRAM(S): at 09:00

## 2018-04-21 RX ADMIN — Medication 1000 MILLIGRAM(S): at 10:00

## 2018-04-21 RX ADMIN — INSULIN GLARGINE 12 UNIT(S): 100 INJECTION, SOLUTION SUBCUTANEOUS at 13:09

## 2018-04-21 RX ADMIN — SODIUM CHLORIDE 1000 MILLILITER(S): 9 INJECTION INTRAMUSCULAR; INTRAVENOUS; SUBCUTANEOUS at 08:59

## 2018-04-21 RX ADMIN — HEPARIN SODIUM 5000 UNIT(S): 5000 INJECTION INTRAVENOUS; SUBCUTANEOUS at 22:37

## 2018-04-21 RX ADMIN — ATORVASTATIN CALCIUM 80 MILLIGRAM(S): 80 TABLET, FILM COATED ORAL at 22:37

## 2018-04-21 RX ADMIN — GABAPENTIN 100 MILLIGRAM(S): 400 CAPSULE ORAL at 22:37

## 2018-04-21 RX ADMIN — SENNA PLUS 1 TABLET(S): 8.6 TABLET ORAL at 22:37

## 2018-04-21 RX ADMIN — INSULIN GLARGINE 10 UNIT(S): 100 INJECTION, SOLUTION SUBCUTANEOUS at 23:35

## 2018-04-21 NOTE — PHYSICAL THERAPY INITIAL EVALUATION ADULT - PLANNED THERAPY INTERVENTIONS, PT EVAL
balance training/strengthening/Goal: To negotiate stairs w/ one handrail step on step/bed mobility training/transfer training/gait training

## 2018-04-21 NOTE — ED PROVIDER NOTE - MEDICAL DECISION MAKING DETAILS
89M pmhx cad, dm, htn, s/p fall from bed this AM. Has amnesia about event. On thinners + coreg,  and bp soft at 100/65.Will obtain CTAP to r/o retro bleed, ct head, cbc, cmp, pro bnp, ekg,

## 2018-04-21 NOTE — ED ADULT TRIAGE NOTE - SOURCE OF INFORMATION
Please see message above. Have you received lab results from [facility] for pt?   Please advise,   Thank you EMS

## 2018-04-21 NOTE — PHYSICAL THERAPY INITIAL EVALUATION ADULT - ADDITIONAL COMMENTS
lives w/ lives w/wife in apt w/ elevator, wife has cognitive issues and they have a caretaker for the wife  5 days 10-3pm, pt uses walker/ was d/c from hospital 3 weeks ago w/ a compression fx L1 /has a LSO at home but admits to not wearing it, pt fell OOB this am and c/o back pain,

## 2018-04-21 NOTE — ED ADULT NURSE NOTE - OBJECTIVE STATEMENT
89 y.o M arrived to ED via EMS s/p fall in home. A&Ox3, poor historian. Pt states this morning he had an "accidental fall" OOB onto the floor, states he was on the floor until his daughter found him because he couldn't get up. Fell onto is stomach, states he "bumped his head" on forehead, no ecchymosis or hematoma noted. denies LOC, states he takes aspirin "sometimes". states he has fallen out of bed in the past as well. pt c/o pain to LL back. pt states he has intermittent dizziness upon over exerting himself x1 year, also endorses ongoing intermittent midepigastric abdominal pain x1year. upon assessment respirations nonlabored, abdomen soft, neuro intact, face symmetrical, moves all extremities. Denies CP, SOB, N/V/D, fevers, chills, HA, pain/burning upon urination, new abdominal pain. Safety and comfort provided/maintained.

## 2018-04-21 NOTE — PHYSICAL THERAPY INITIAL EVALUATION ADULT - ACTIVE RANGE OF MOTION EXAMINATION, REHAB EVAL
no c/o pain back w/ ROM/bilateral  lower extremity Active ROM was WFL (within functional limits)/bilateral upper extremity Active ROM was WFL (within functional limits)

## 2018-04-21 NOTE — H&P ADULT - HISTORY OF PRESENT ILLNESS
88 yo Mlae with a pmhx of CAD with stent 2011, cabg early 2000s, dm2, htn, hld, here c/o left lower back pain s/p falling out of bed this AM. Pt says last thing he remembers is waking up on the floor. He believes he just accidentally rolled out of bed. Says he "may have bumped" his head lightly but is having no focal pain in head piat. No recent illness/f/c. Pt has known hx of L1 compression fracture and has had similar left lower back pain before s/p other falls. Pt is on plavix and coreg. NKA and has not taken anything for the pain.  He has been evaluated by PT in ED, and rehab has been recommended. Family notified

## 2018-04-21 NOTE — ED PROVIDER NOTE - OBJECTIVE STATEMENT
89M pmhx cad (stent 2011, cabg early 2000s), dm, htn, hld, here c/o left lower back pain s/p falling out of bed this AM. Pt says last thing he remembers is waking up on the floor. He believes he just accidentally rolled out of bed. Says he "may have bumped" his head lightly but is having no focal pain in head pita. No recent illness/f/c. Pt has known hx of L1 compression fracture and has had similar left lower back pain before s/p other falls. Pt is on plavix and coreg. NKA and has not taken anything for the pain.

## 2018-04-21 NOTE — ED PROVIDER NOTE - CARE PLAN
Principal Discharge DX:	Musculoskeletal pain Principal Discharge DX:	Musculoskeletal pain  Secondary Diagnosis:	Fall as cause of accidental injury in home as place of occurrence, initial encounter  Secondary Diagnosis:	Ambulatory dysfunction

## 2018-04-21 NOTE — ED PROVIDER NOTE - SECONDARY DIAGNOSIS.
Fall as cause of accidental injury in home as place of occurrence, initial encounter Ambulatory dysfunction

## 2018-04-21 NOTE — PHYSICAL THERAPY INITIAL EVALUATION ADULT - DIAGNOSIS, PT EVAL
decreased strength, functional mobility, pain in back h/o L1 compression fx decreased strength, functional mobility, pain in back h/o L1 compression fx, hip xray (-), xray pelvis (-), CT abd/pelvis peristant L1 compression fx head CT (-), C/s spine CT (-)

## 2018-04-21 NOTE — ED PROVIDER NOTE - ATTENDING CONTRIBUTION TO CARE
ATTENDING MD:  I, Johnathan Ulrich, personally have seen and examined this patient.  I have discussed all aspects of care with the resident physician. Resident note reviewed and agree on plan of care and except where noted.  See HPI, PE, and MDM for details.     s/p fall, mild L-hip.pelvic tenderness but full ROM, no spinal tenderness, bruising on head norwhere else, neuro exam nonfocal. chest nontender, lungs clear to auscultation bilaterally, equal breath sounds bilaterally, LLQ abdominal tenderness. unsteady gait    MDM: s/p fall. CT to r/o major injuries, PT consult if no major injuries as despite this benign mechanism patient appears to be high fall risk with recent faills and failing outpatient PT

## 2018-04-21 NOTE — ED PROVIDER NOTE - PROGRESS NOTE DETAILS
imaging negative, no medical cause, unsteady gait with mulitple recent falls, not tolerating outpatient PT well. PT consulted PT recommends acute rehab, CM consulted to see if can be direct placement Cannot place for 2 days, not CDU canddiate will admit to medicine awaiting rehab placement

## 2018-04-22 LAB
GLUCOSE BLDC GLUCOMTR-MCNC: 120 MG/DL — HIGH (ref 70–99)
GLUCOSE BLDC GLUCOMTR-MCNC: 135 MG/DL — HIGH (ref 70–99)
GLUCOSE BLDC GLUCOMTR-MCNC: 145 MG/DL — HIGH (ref 70–99)
GLUCOSE BLDC GLUCOMTR-MCNC: 80 MG/DL — SIGNIFICANT CHANGE UP (ref 70–99)

## 2018-04-22 RX ADMIN — Medication 40 MILLIGRAM(S): at 17:33

## 2018-04-22 RX ADMIN — CARVEDILOL PHOSPHATE 6.25 MILLIGRAM(S): 80 CAPSULE, EXTENDED RELEASE ORAL at 17:33

## 2018-04-22 RX ADMIN — ATORVASTATIN CALCIUM 80 MILLIGRAM(S): 80 TABLET, FILM COATED ORAL at 22:29

## 2018-04-22 RX ADMIN — HEPARIN SODIUM 5000 UNIT(S): 5000 INJECTION INTRAVENOUS; SUBCUTANEOUS at 12:42

## 2018-04-22 RX ADMIN — CARVEDILOL PHOSPHATE 6.25 MILLIGRAM(S): 80 CAPSULE, EXTENDED RELEASE ORAL at 06:49

## 2018-04-22 RX ADMIN — GABAPENTIN 100 MILLIGRAM(S): 400 CAPSULE ORAL at 22:29

## 2018-04-22 RX ADMIN — HEPARIN SODIUM 5000 UNIT(S): 5000 INJECTION INTRAVENOUS; SUBCUTANEOUS at 22:29

## 2018-04-22 RX ADMIN — GABAPENTIN 100 MILLIGRAM(S): 400 CAPSULE ORAL at 12:41

## 2018-04-22 RX ADMIN — Medication 40 MILLIGRAM(S): at 06:48

## 2018-04-22 RX ADMIN — INSULIN GLARGINE 10 UNIT(S): 100 INJECTION, SOLUTION SUBCUTANEOUS at 22:29

## 2018-04-22 RX ADMIN — CLOPIDOGREL BISULFATE 75 MILLIGRAM(S): 75 TABLET, FILM COATED ORAL at 12:41

## 2018-04-22 RX ADMIN — Medication 81 MILLIGRAM(S): at 12:41

## 2018-04-23 ENCOUNTER — TRANSCRIPTION ENCOUNTER (OUTPATIENT)
Age: 83
End: 2018-04-23

## 2018-04-23 LAB
ANION GAP SERPL CALC-SCNC: 11 MMOL/L — SIGNIFICANT CHANGE UP (ref 5–17)
BUN SERPL-MCNC: 21 MG/DL — SIGNIFICANT CHANGE UP (ref 7–23)
CALCIUM SERPL-MCNC: 9.3 MG/DL — SIGNIFICANT CHANGE UP (ref 8.4–10.5)
CHLORIDE SERPL-SCNC: 101 MMOL/L — SIGNIFICANT CHANGE UP (ref 96–108)
CO2 SERPL-SCNC: 26 MMOL/L — SIGNIFICANT CHANGE UP (ref 22–31)
CREAT SERPL-MCNC: 1.34 MG/DL — HIGH (ref 0.5–1.3)
GLUCOSE BLDC GLUCOMTR-MCNC: 145 MG/DL — HIGH (ref 70–99)
GLUCOSE BLDC GLUCOMTR-MCNC: 154 MG/DL — HIGH (ref 70–99)
GLUCOSE BLDC GLUCOMTR-MCNC: 169 MG/DL — HIGH (ref 70–99)
GLUCOSE BLDC GLUCOMTR-MCNC: 171 MG/DL — HIGH (ref 70–99)
GLUCOSE SERPL-MCNC: 133 MG/DL — HIGH (ref 70–99)
HCT VFR BLD CALC: 36.2 % — LOW (ref 39–50)
HGB BLD-MCNC: 11.8 G/DL — LOW (ref 13–17)
MCHC RBC-ENTMCNC: 29.4 PG — SIGNIFICANT CHANGE UP (ref 27–34)
MCHC RBC-ENTMCNC: 32.6 GM/DL — SIGNIFICANT CHANGE UP (ref 32–36)
MCV RBC AUTO: 90 FL — SIGNIFICANT CHANGE UP (ref 80–100)
PLATELET # BLD AUTO: 221 K/UL — SIGNIFICANT CHANGE UP (ref 150–400)
POTASSIUM SERPL-MCNC: 3.8 MMOL/L — SIGNIFICANT CHANGE UP (ref 3.5–5.3)
POTASSIUM SERPL-SCNC: 3.8 MMOL/L — SIGNIFICANT CHANGE UP (ref 3.5–5.3)
RBC # BLD: 4.02 M/UL — LOW (ref 4.2–5.8)
RBC # FLD: 15.4 % — HIGH (ref 10.3–14.5)
SODIUM SERPL-SCNC: 138 MMOL/L — SIGNIFICANT CHANGE UP (ref 135–145)
WBC # BLD: 8.7 K/UL — SIGNIFICANT CHANGE UP (ref 3.8–10.5)
WBC # FLD AUTO: 8.7 K/UL — SIGNIFICANT CHANGE UP (ref 3.8–10.5)

## 2018-04-23 RX ORDER — INSULIN GLARGINE 100 [IU]/ML
10 INJECTION, SOLUTION SUBCUTANEOUS
Qty: 0 | Refills: 0 | COMMUNITY

## 2018-04-23 RX ORDER — INSULIN GLARGINE 100 [IU]/ML
12 INJECTION, SOLUTION SUBCUTANEOUS
Qty: 0 | Refills: 0 | COMMUNITY

## 2018-04-23 RX ORDER — FUROSEMIDE 40 MG
1 TABLET ORAL
Qty: 0 | Refills: 0 | COMMUNITY
Start: 2018-04-23

## 2018-04-23 RX ORDER — ACETAMINOPHEN 500 MG
2 TABLET ORAL
Qty: 0 | Refills: 0 | COMMUNITY

## 2018-04-23 RX ORDER — FUROSEMIDE 40 MG
1 TABLET ORAL
Qty: 0 | Refills: 0 | COMMUNITY

## 2018-04-23 RX ORDER — GABAPENTIN 400 MG/1
1 CAPSULE ORAL
Qty: 0 | Refills: 0 | COMMUNITY

## 2018-04-23 RX ORDER — GABAPENTIN 400 MG/1
1 CAPSULE ORAL
Qty: 0 | Refills: 0 | COMMUNITY
Start: 2018-04-23

## 2018-04-23 RX ORDER — ASPIRIN/CALCIUM CARB/MAGNESIUM 324 MG
1 TABLET ORAL
Qty: 0 | Refills: 0 | COMMUNITY

## 2018-04-23 RX ADMIN — Medication 1: at 12:20

## 2018-04-23 RX ADMIN — Medication 650 MILLIGRAM(S): at 06:39

## 2018-04-23 RX ADMIN — CARVEDILOL PHOSPHATE 6.25 MILLIGRAM(S): 80 CAPSULE, EXTENDED RELEASE ORAL at 17:02

## 2018-04-23 RX ADMIN — CLOPIDOGREL BISULFATE 75 MILLIGRAM(S): 75 TABLET, FILM COATED ORAL at 12:40

## 2018-04-23 RX ADMIN — CARVEDILOL PHOSPHATE 6.25 MILLIGRAM(S): 80 CAPSULE, EXTENDED RELEASE ORAL at 05:04

## 2018-04-23 RX ADMIN — Medication 650 MILLIGRAM(S): at 06:01

## 2018-04-23 RX ADMIN — HEPARIN SODIUM 5000 UNIT(S): 5000 INJECTION INTRAVENOUS; SUBCUTANEOUS at 22:01

## 2018-04-23 RX ADMIN — ATORVASTATIN CALCIUM 80 MILLIGRAM(S): 80 TABLET, FILM COATED ORAL at 22:01

## 2018-04-23 RX ADMIN — Medication 81 MILLIGRAM(S): at 12:40

## 2018-04-23 RX ADMIN — GABAPENTIN 100 MILLIGRAM(S): 400 CAPSULE ORAL at 09:29

## 2018-04-23 RX ADMIN — Medication 40 MILLIGRAM(S): at 17:02

## 2018-04-23 RX ADMIN — HEPARIN SODIUM 5000 UNIT(S): 5000 INJECTION INTRAVENOUS; SUBCUTANEOUS at 09:29

## 2018-04-23 RX ADMIN — Medication 40 MILLIGRAM(S): at 05:04

## 2018-04-23 RX ADMIN — Medication 1: at 16:59

## 2018-04-23 RX ADMIN — GABAPENTIN 100 MILLIGRAM(S): 400 CAPSULE ORAL at 22:01

## 2018-04-23 RX ADMIN — INSULIN GLARGINE 10 UNIT(S): 100 INJECTION, SOLUTION SUBCUTANEOUS at 22:05

## 2018-04-23 NOTE — DISCHARGE NOTE ADULT - CARE PLAN
Principal Discharge DX:	Fall as cause of accidental injury in home as place of occurrence, initial encounter  Goal:	resolution of symptoms  Assessment and plan of treatment:	pain management  follow up PT recommendations  Secondary Diagnosis:	Controlled type 2 diabetes mellitus without complication, with long-term current use of insulin  Assessment and plan of treatment:	HgA1C this admission.  Make sure you get your HgA1c checked every three months.  If you take oral diabetes medications, check your blood glucose two times a day.  If you take insulin, check your blood glucose before meals and at bedtime.  It's important not to skip any meals.  Keep a log of your blood glucose results and always take it with you to your doctor appointments.  Keep a list of your current medications including injectables and over the counter medications and bring this medication list with you to all your doctor appointments.  If you have not seen your ophthalmologist this year call for appointment.  Check your feet daily for redness, sores, or openings. Do not self treat. If no improvement in two days call your primary care physician for an appointment.  Low blood sugar (hypoglycemia) is a blood sugar below 70mg/dl. Check your blood sugar if you feel signs/symptoms of hypoglycemia. If your blood sugar is below 70 take 15 grams of carbohydrates (ex 4 oz of apple juice, 3-4 glucose tablets, or 4-6 oz of regular soda) wait 15 minutes and repeat blood sugar to make sure it comes up above 70.  If your blood sugar is above 70 and you are due for a meal, have a meal.  If you are not due for a meal have a snack.  This snack helps keeps your blood sugar at a safe range.  Secondary Diagnosis:	HTN (hypertension)  Assessment and plan of treatment:	Follow up with your medical doctor to establish long term blood pressure treatment goals.

## 2018-04-23 NOTE — DISCHARGE NOTE ADULT - PLAN OF CARE
resolution of symptoms pain management  follow up PT recommendations HgA1C this admission.  Make sure you get your HgA1c checked every three months.  If you take oral diabetes medications, check your blood glucose two times a day.  If you take insulin, check your blood glucose before meals and at bedtime.  It's important not to skip any meals.  Keep a log of your blood glucose results and always take it with you to your doctor appointments.  Keep a list of your current medications including injectables and over the counter medications and bring this medication list with you to all your doctor appointments.  If you have not seen your ophthalmologist this year call for appointment.  Check your feet daily for redness, sores, or openings. Do not self treat. If no improvement in two days call your primary care physician for an appointment.  Low blood sugar (hypoglycemia) is a blood sugar below 70mg/dl. Check your blood sugar if you feel signs/symptoms of hypoglycemia. If your blood sugar is below 70 take 15 grams of carbohydrates (ex 4 oz of apple juice, 3-4 glucose tablets, or 4-6 oz of regular soda) wait 15 minutes and repeat blood sugar to make sure it comes up above 70.  If your blood sugar is above 70 and you are due for a meal, have a meal.  If you are not due for a meal have a snack.  This snack helps keeps your blood sugar at a safe range. Follow up with your medical doctor to establish long term blood pressure treatment goals.

## 2018-04-23 NOTE — PROGRESS NOTE ADULT - PROBLEM SELECTOR PLAN 1
From fall. Tylenol ordered for pain. Awaiting transfer to rehab. He can be transported safely in a private vehicle for transfer to rehab.

## 2018-04-23 NOTE — DISCHARGE NOTE ADULT - MEDICATION SUMMARY - MEDICATIONS TO TAKE
I will START or STAY ON the medications listed below when I get home from the hospital:    aspirin 81 mg oral delayed release tablet  -- 1 tab(s) by mouth once a day  -- Indication: For prophylaxis    Tylenol 500 mg oral tablet  -- 2 tab(s) by mouth every 6 hours, As Needed  -- Indication: For pain    gabapentin 100 mg oral capsule  -- 1 cap(s) by mouth 2 times a day  -- Indication: For neuropathy    Lantus Solostar Pen  -- 10 unit(s) subcutaneous once a day (at bedtime)  -- Indication: For diabetes    atorvastatin 80 mg oral tablet  -- 0.5 tab(s) by mouth once a day  -- Indication: For hld    clopidogrel 75 mg oral tablet  -- 1 tab(s) by mouth once a day  -- Indication: For Cad    carvedilol 6.25 mg oral tablet  -- 1 tab(s) by mouth 2 times a day  -- Indication: For htn    furosemide 40 mg oral tablet  -- 1 tab(s) by mouth 2 times a day  -- Indication: For Chf    senna oral tablet  -- 1 tab(s) by mouth every other day  -- Indication: For Constipation

## 2018-04-23 NOTE — DISCHARGE NOTE ADULT - PATIENT PORTAL LINK FT
You can access the [facility] Portal, offered by [facility], by registering with the following website: [email or web address]

## 2018-04-23 NOTE — DISCHARGE NOTE ADULT - MEDICATION SUMMARY - MEDICATIONS TO STOP TAKING
I will STOP taking the medications listed below when I get home from the hospital:    sAXagliptin 2.5 mg oral tablet  -- 1 tab(s) by mouth once a day    Prandin 1 mg oral tablet  -- 1 tab(s) by mouth 3 times a day (before meals)

## 2018-04-23 NOTE — DISCHARGE NOTE ADULT - HOSPITAL COURSE
89 yo Mlae with a pmhx of CAD with stent 2011, cabg early 2000s, dm2, htn, hld, here c/o left lower back pain s/p falling out of bed this AM. Pt says last thing he remembers is waking up on the floor. He believes he just accidentally rolled out of bed. Says he "may have bumped" his head lightly but is having no focal pain in head pita. No recent illness/f/c. Pt has known hx of L1 compression fracture and has had similar left lower back pain before s/p other falls. Pt is on plavix and coreg. NKA and has not taken anything for the pain.  He has been evaluated by PT in ED, and rehab has been recommended.

## 2018-04-23 NOTE — DISCHARGE NOTE ADULT - CARE PROVIDER_API CALL
Manuel Sotelo (MAYO), Internal Medicine  [address] [place] [address]  Phone: [phone]  Fax: [phone]

## 2018-04-23 NOTE — PROGRESS NOTE ADULT - SUBJECTIVE AND OBJECTIVE BOX
Patient is a 89y old  Male who presents with a chief complaint of S/P FALL AT HOME (23 Apr 2018 10:14)      SUBJECTIVE / OVERNIGHT EVENTS:  No new symptoms. Still unable to move.  Review of Systems:   CONSTITUTIONAL: No fever, weight loss, or fatigue  EYES: No eye pain, visual disturbances, or discharge  ENMT:  No difficulty hearing, tinnitus, vertigo; No sinus or throat pain  NECK: No pain or stiffness  BREASTS: No pain, masses, or nipple discharge  RESPIRATORY: No cough, wheezing, chills or hemoptysis; No shortness of breath  CARDIOVASCULAR: No chest pain, palpitations, dizziness, or leg swelling  GASTROINTESTINAL: No abdominal or epigastric pain. No nausea, vomiting, or hematemesis; No diarrhea or constipation. No melena or hematochezia.  GENITOURINARY: No dysuria, frequency, hematuria, or incontinence  NEUROLOGICAL: No headaches, memory loss, loss of strength, numbness, or tremors  SKIN: No itching, burning, rashes, or lesions   LYMPH NODES: No enlarged glands  ENDOCRINE: No heat or cold intolerance; No hair loss  MUSCULOSKELETAL: No joint pain or swelling; No muscle, back, or extremity pain  PSYCHIATRIC: No depression, anxiety, mood swings, or difficulty sleeping  HEME/LYMPH: No easy bruising, or bleeding gums  ALLERY AND IMMUNOLOGIC: No hives or eczema    MEDICATIONS  (STANDING):  aspirin enteric coated 81 milliGRAM(s) Oral daily  atorvastatin 80 milliGRAM(s) Oral at bedtime  carvedilol 6.25 milliGRAM(s) Oral every 12 hours  clopidogrel Tablet 75 milliGRAM(s) Oral daily  dextrose 5%. 1000 milliLiter(s) (50 mL/Hr) IV Continuous <Continuous>  dextrose 50% Injectable 12.5 Gram(s) IV Push once  furosemide    Tablet 40 milliGRAM(s) Oral two times a day  gabapentin 100 milliGRAM(s) Oral two times a day  heparin  Injectable 5000 Unit(s) SubCutaneous every 12 hours  insulin glargine Injectable (LANTUS) 10 Unit(s) SubCutaneous at bedtime  insulin lispro (HumaLOG) corrective regimen sliding scale   SubCutaneous three times a day before meals    MEDICATIONS  (PRN):  acetaminophen   Tablet. 650 milliGRAM(s) Oral every 6 hours PRN Moderate Pain (4 - 6)  dextrose Gel 1 Dose(s) Oral once PRN Blood Glucose LESS THAN 70 milliGRAM(s)/deciliter  glucagon  Injectable 1 milliGRAM(s) IntraMuscular once PRN Glucose LESS THAN 70 milligrams/deciliter      PHYSICAL EXAM:  Vital Signs Last 24 Hrs  T(C): 36.3 (23 Apr 2018 11:51), Max: 37.2 (23 Apr 2018 04:36)  T(F): 97.4 (23 Apr 2018 11:51), Max: 99 (23 Apr 2018 04:36)  HR: 88 (23 Apr 2018 17:01) (86 - 98)  BP: 122/- (23 Apr 2018 17:01) (100/62 - 123/81)  BP(mean): --  RR: 18 (23 Apr 2018 11:51) (17 - 18)  SpO2: 97% (23 Apr 2018 11:51) (95% - 100%)  I&O's Summary    22 Apr 2018 07:01  -  23 Apr 2018 07:00  --------------------------------------------------------  IN: 240 mL / OUT: 1050 mL / NET: -810 mL    23 Apr 2018 07:01  -  23 Apr 2018 18:17  --------------------------------------------------------  IN: 480 mL / OUT: 450 mL / NET: 30 mL      GENERAL: NAD, well-developed  HEAD:  Atraumatic, Normocephalic  EYES: EOMI, PERRLA, conjunctiva and sclera clear  NECK: Supple, No JVD  CHEST/LUNG: Clear to auscultation bilaterally; No wheeze  HEART: Regular rate and rhythm; No murmurs, rubs, or gallops  ABDOMEN: Soft, Nontender, Nondistended; Bowel sounds present  EXTREMITIES:  2+ Peripheral Pulses, No clubbing, cyanosis, or edema  PSYCH: AAOx3  NEUROLOGY: non-focal  SKIN: No rashes or lesions    LABS:  CAPILLARY BLOOD GLUCOSE      POCT Blood Glucose.: 154 mg/dL (23 Apr 2018 16:42)  POCT Blood Glucose.: 169 mg/dL (23 Apr 2018 12:04)  POCT Blood Glucose.: 145 mg/dL (23 Apr 2018 07:59)  POCT Blood Glucose.: 135 mg/dL (22 Apr 2018 21:56)                          11.8   8.70  )-----------( 221      ( 23 Apr 2018 07:31 )             36.2     04-23    138  |  101  |  21  ----------------------------<  133<H>  3.8   |  26  |  1.34<H>    Ca    9.3      23 Apr 2018 05:55                RADIOLOGY & ADDITIONAL TESTS:    Imaging Personally Reviewed:    Consultant(s) Notes Reviewed:      Care Discussed with Consultants/Other Providers:

## 2018-04-23 NOTE — DISCHARGE NOTE ADULT - SECONDARY DIAGNOSIS.
Controlled type 2 diabetes mellitus without complication, with long-term current use of insulin HTN (hypertension)

## 2018-04-24 VITALS — WEIGHT: 219.8 LBS

## 2018-04-24 LAB
GLUCOSE BLDC GLUCOMTR-MCNC: 160 MG/DL — HIGH (ref 70–99)
GLUCOSE BLDC GLUCOMTR-MCNC: 167 MG/DL — HIGH (ref 70–99)

## 2018-04-24 PROCEDURE — 96374 THER/PROPH/DIAG INJ IV PUSH: CPT

## 2018-04-24 PROCEDURE — 81003 URINALYSIS AUTO W/O SCOPE: CPT

## 2018-04-24 PROCEDURE — 73502 X-RAY EXAM HIP UNI 2-3 VIEWS: CPT

## 2018-04-24 PROCEDURE — 96372 THER/PROPH/DIAG INJ SC/IM: CPT | Mod: XU

## 2018-04-24 PROCEDURE — G8978: CPT | Mod: CJ

## 2018-04-24 PROCEDURE — G8980: CPT | Mod: CJ

## 2018-04-24 PROCEDURE — 70450 CT HEAD/BRAIN W/O DYE: CPT

## 2018-04-24 PROCEDURE — 72125 CT NECK SPINE W/O DYE: CPT

## 2018-04-24 PROCEDURE — 97162 PT EVAL MOD COMPLEX 30 MIN: CPT

## 2018-04-24 PROCEDURE — 99285 EMERGENCY DEPT VISIT HI MDM: CPT | Mod: 25

## 2018-04-24 PROCEDURE — 72170 X-RAY EXAM OF PELVIS: CPT

## 2018-04-24 PROCEDURE — 80048 BASIC METABOLIC PNL TOTAL CA: CPT

## 2018-04-24 PROCEDURE — 74177 CT ABD & PELVIS W/CONTRAST: CPT

## 2018-04-24 PROCEDURE — 71045 X-RAY EXAM CHEST 1 VIEW: CPT

## 2018-04-24 PROCEDURE — 80053 COMPREHEN METABOLIC PANEL: CPT

## 2018-04-24 PROCEDURE — G8979: CPT | Mod: CJ

## 2018-04-24 PROCEDURE — 83880 ASSAY OF NATRIURETIC PEPTIDE: CPT

## 2018-04-24 PROCEDURE — 82962 GLUCOSE BLOOD TEST: CPT

## 2018-04-24 PROCEDURE — 85027 COMPLETE CBC AUTOMATED: CPT

## 2018-04-24 RX ADMIN — HEPARIN SODIUM 5000 UNIT(S): 5000 INJECTION INTRAVENOUS; SUBCUTANEOUS at 11:20

## 2018-04-24 RX ADMIN — Medication 81 MILLIGRAM(S): at 11:21

## 2018-04-24 RX ADMIN — Medication 40 MILLIGRAM(S): at 06:06

## 2018-04-24 RX ADMIN — CARVEDILOL PHOSPHATE 6.25 MILLIGRAM(S): 80 CAPSULE, EXTENDED RELEASE ORAL at 06:06

## 2018-04-24 RX ADMIN — GABAPENTIN 100 MILLIGRAM(S): 400 CAPSULE ORAL at 11:20

## 2018-04-24 RX ADMIN — Medication 1: at 12:36

## 2018-04-24 RX ADMIN — CLOPIDOGREL BISULFATE 75 MILLIGRAM(S): 75 TABLET, FILM COATED ORAL at 11:21

## 2018-04-24 RX ADMIN — Medication 1: at 08:14

## 2018-04-24 NOTE — DIETITIAN INITIAL EVALUATION ADULT. - NS FNS REASON FOR WEIGHT CHANG
daughter attributes the wt loss to pt eating smaller meals and following healthy carbohydrate controlled diet

## 2018-04-24 NOTE — DIETITIAN INITIAL EVALUATION ADULT. - OTHER INFO
Nutrition consult received for HgbA1C 8.1%. Pt with no changes in appetite and po intakes, still consuming about 50% po intakes per daughter, flowsheet shows 40% po intakes. No acute GI distress, +BM today. No chewing/swallowing difficulty noted. NKFA. PTA did not take any supplements. Pt with T2DM, on lantus pen and Prandin at home, checks fingersticks 2 x day. Pt actively being discharged to rehab facility at time of visit. Daughter/pt/wife denies having any questions about following Consistent Carbohydrate modified diet.

## 2018-04-24 NOTE — DIETITIAN INITIAL EVALUATION ADULT. - ORAL INTAKE PTA
good/Per daughter, pt has been eating 3 meals a day, but noted decreased portion sizes as years passed - attributed to aging. Pt's meals are well balanced with protein, carbohydrates and vegetables per daughter

## 2018-04-24 NOTE — DIETITIAN INITIAL EVALUATION ADULT. - NS AS NUTRI INTERV ED CONTENT
Daughter involved in helping pt's modified diet at home and understands carbohydrate food sources and importance of avoiding concentrated sweets. Pt's current HgbA1C 8.1% WDL given pt's advanced age. RD availability made known to pt/family.

## 2018-04-24 NOTE — DIETITIAN INITIAL EVALUATION ADULT. - ADHERENCE
good/Pt avoids concentrated sweets and monitors carbohydrate portion sizes with help of daughter. Daughter states pt would ask for cookies and muffins on regular basis, which daughter controls the amount for pt

## 2018-04-24 NOTE — DIETITIAN INITIAL EVALUATION ADULT. - ENERGY NEEDS
Height: 71 inches, Weight: 203.7 pounds  BMI: 28 kg/m2 IBW: 172 pounds (+/-10%), %IBW: 118%  Pertinent Info: Per chart, 89 y/o male with PMH CAD, CABG, T2DM, HTN, HLD admitted with musculoskeletal pain s/p fall at home, d/c to [facility] today. +1 bilateral legs edema, no pressure ulcers noted at this time.

## 2018-05-14 RX ORDER — CLOPIDOGREL BISULFATE 75 MG/1
1 TABLET, FILM COATED ORAL
Qty: 0 | Refills: 0 | COMMUNITY

## 2018-05-14 RX ORDER — INSULIN GLARGINE 100 [IU]/ML
6 INJECTION, SOLUTION SUBCUTANEOUS
Qty: 0 | Refills: 0 | COMMUNITY

## 2018-05-14 RX ORDER — GABAPENTIN 400 MG/1
2 CAPSULE ORAL
Qty: 0 | Refills: 0 | COMMUNITY

## 2018-05-14 RX ORDER — ASPIRIN/CALCIUM CARB/MAGNESIUM 324 MG
1 TABLET ORAL
Qty: 0 | Refills: 0 | COMMUNITY

## 2018-05-14 RX ORDER — ISOSORBIDE MONONITRATE 60 MG/1
1 TABLET, EXTENDED RELEASE ORAL
Qty: 0 | Refills: 0 | COMMUNITY

## 2018-05-14 RX ORDER — GABAPENTIN 400 MG/1
1 CAPSULE ORAL
Qty: 0 | Refills: 0 | COMMUNITY

## 2018-05-14 RX ORDER — CARVEDILOL PHOSPHATE 80 MG/1
1 CAPSULE, EXTENDED RELEASE ORAL
Qty: 0 | Refills: 0 | COMMUNITY

## 2018-05-14 RX ORDER — REPAGLINIDE 1 MG/1
1 TABLET ORAL
Qty: 0 | Refills: 0 | COMMUNITY

## 2018-05-14 RX ORDER — ATORVASTATIN CALCIUM 80 MG/1
1 TABLET, FILM COATED ORAL
Qty: 0 | Refills: 0 | COMMUNITY

## 2018-11-11 NOTE — PHYSICAL THERAPY INITIAL EVALUATION ADULT - PERTINENT HX OF CURRENT PROBLEM, REHAB EVAL
Patient/Caregiver provided printed discharge information. 88 y/o M admitted to ED w/  pmhx cad (stent 2011, cabg early 2000s), dm, htn, hld, here c/o left lower back pain s/p falling out of bed this AM. Pt says last thing he remembers is waking up on the floor. He believes he just accidentally rolled out of bed. Says he "may have bumped" his head lightly but is having no focal pain in head pita. Pt has known hx of L1 compression fracture and has had similar left lower back pain before s/p other falls. Pt is on plavix and 88 y/o M admitted to ED w/  pmhx cad (stent 2011, cabg early 2000s), dm, htn, hld, here c/o left lower back pain s/p falling out of bed this AM. Pt says last thing he remembers is waking up on the floor. He believes he just accidentally rolled out of bed. Says he "may have bumped" his head lightly but is having no focal pain in head pita. Pt has known hx of L1 compression fracture and has had similar left lower back pain before s/p other falls. Pt is on plavix and coreg

## 2019-10-16 NOTE — PROGRESS NOTE ADULT - PROVIDER SPECIALTY LIST ADULT
Cardiology
Electrophysiology
Electrophysiology
Internal Medicine
Nephrology
none

## 2021-02-11 NOTE — CONSULT NOTE ADULT - CONSULT REQUESTED BY NAME
[facility] Palliative Medicine Follow-up Note  02/11/21    CHIEF COMPLAINT  F/u for goals of care in the context of HECTOR and HF.  -------------------------------------------------------------------------------------------------------------------------------    SUBJECTIVE  Interval events: renal biopsy completed 2/10. Third session of HD today. Feeling very fatigued and says breathing feels \"a little hard\" today. Happy he was able to work with therapy, hoping he can build up his strength and endurance so he can return home. Continues to take things a day at a time. No major questions or concerns at present. [place] visit today d/t fatigue.     -------------------------------------------------------------------------------------------------------------------------------    OBJECTIVE  Visit Vitals  /63 (BP Location: RUE - Right upper extremity, Patient Position: Semi-Boss's)   Pulse 81   Temp 97.5 °F (36.4 °C) (Axillary)   Resp 18   Ht 5' 9\" (1.753 m)   Wt 65.1 kg   SpO2 93%   BMI 21.19 kg/m²       LAST BM: 1 (02/11/21 6967)    Medications:  • aspirin  81 mg Oral Daily   • warfarin  4.5 mg Oral Q Evening   • midodrine  5 mg Oral TID AC   • metoPROLOL succinate  12.5 mg Oral Daily   • [Held by provider] polyethylene glycol  17 g Oral Daily   • cefTRIAXone (ROCEPHIN) IV  2,000 mg Intravenous Daily   • [Held by provider] furosemide  20 mg Intravenous BID   • sodium chloride (PF)  2 mL Intracatheter 2 times per day   • WARFARIN - PHARMACIST MONITORED   Does not apply See Admin Instructions   • atorvastatin  40 mg Oral Nightly   • cholecalciferol  25 mcg Oral Daily   • [Held by provider] finasteride  5 mg Oral Daily   • influenza virus quadrivalent vaccine inactivated injection  0.5 mL Intramuscular Once     PRN medication use over previous 24 hr: Acetaminophen 650mg x1; Ondansetron 4mg IV x1    Exam:  GEN: sitting up in bed, NAD   MS: A&O x 3 but looks tired, conversant, good insight into current status  EYES: no icterus  Medicine or conjunctivitis  HENT: tongue and lips are moist  NECK: trachea midline  PUL: respirations shallow, mildly labored, on 2L via NC  ABD: non-distended  : deferred  EXTs: no edema  SKIN: Warm and dry, well perfused.  NEURO: no obvious focal deficits, able to move all 4 extremities, follows commands    Weight Trends:  Wt Readings from Last 10 Encounters:   02/11/21 65.1 kg   01/18/21 69.9 kg   01/12/21 65.8 kg   01/11/21 65.8 kg   12/28/20 67 kg   12/09/20 67.6 kg   12/07/20 65.8 kg   11/26/20 66.7 kg   10/19/20 66.7 kg   07/08/20 65.1 kg       Labs:  Recent Labs   Lab 02/11/21  0239 02/10/21  2015 02/10/21  0438 02/09/21  0551   SODIUM 139  --  137  --  137   POTASSIUM 4.4 4.2 4.0   < > 3.7   BUN 38*  --  53*  --  72*   CREATININE 4.22*  --  4.75*  --  5.43*    < > = values in this interval not displayed.     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 Goals: remain life-prolonging at present. Admits he is very overwhelmed d/t all that has happened during his hospitalization but he is not at a point where he ready to forgo further life-prolonging treatment, still has things he would like to do. Take things a day at a time and see how it goes.         -He wishes to continue with HD to see how he does with it.         -Very happy that he can have thin liquids.        -Does not want to be a burden to his family, particularly his son.   2. Symptoms:         -Low mood: does  not endorse outright depression but admits to feeling \"down\" d/t his current status. Would continue to monitor. Appreciative of writer and  visits as he feels it is good to \"talk things out.\"         -Dyspnea: feeling more SOB today, oxygen helping slightly.         -Fatigue: suspect heavily r/t HD but also anemia/HF/overall deconditioning  3. Advance Care Planning:         -POA-HC: document on file, is activated, primary agent is his wife. Aware his son has also been very involved in discussion with providers during hospital stay.        -Code status: limit of no CPR per prior discussion with Dr. Angelo. Agreeable to short term intubation but would not accept long-term mechanical ventilation.   4. Disposition: from home with his wife. He would like to return home. To be determined pending medical status, progress with therapy, available support at home, may need FRANSISCO. SW following. Would IRP be a possibility?   5. Gu[place]: appreciate  support, patient enjoyed visit.   6. Will continue to follow for ongoing support.     -------------------------------------------------------------------------------------------------------------------------------      Thank you for allowing us to participate in this patient's care.     Time Statement:  Total time today was 15 minutes ([id number]), majority spent counseling and coordinating care.    CHRISTOPH Delgado  Palliative Care  Pager: [phone]    Monday-Friday between the hours of 8am and 4pm, please page directly.  After hours or weekends, please call the answering service [phone]

## 2021-05-18 NOTE — DIETITIAN INITIAL EVALUATION ADULT. - DATE OF WEIGHT PRIOR TO ADM
07-Jan-2016
Quality 110: Preventive Care And Screening: Influenza Immunization: Influenza Immunization previously received during influenza season
Detail Level: Generalized

## 2022-01-07 NOTE — ED PROVIDER NOTE - CPE EDP SKIN NORM
Diet: As Tolerated  Activity: As Tolerated  Continue Reconciled Discharge Medications  F/U in Office 1-2 Weeks   normal...

## 2024-02-20 NOTE — DISCHARGE NOTE ADULT - NSFTFSTATUSABRD_GEN_ALL_CORE
Sy Rojas, [facility] [place] [phone] PATIENT NEEDS ASSISTANCE TO LEAVE RESIDENCE:

## 2024-05-20 NOTE — ED PROVIDER NOTE - NS ED MD TWO NIGHTS YN
[FreeTextEntry1] : mammo and Breast US  medical decision making process. I individually reviewed any labs and any images obtained. This was discussed with my attending and he/she is in agreement with plan of care. Problems Addressed:  Back strain, initial encounter: acute illness or injury  Musculoskeletal pain: acute illness or injury    Amount and/or Complexity of Data Reviewed  Labs: ordered. Decision-making details documented in ED Course. Radiology: ordered and independent interpretation performed. Decision-making details documented in ED Course. ECG/medicine tests: ordered. Decision-making details documented in ED Course. REASSESSMENT     ED Course as of 05/09/23 1452   Tue May 09, 2023   1420 EKG at 14:15-- NSR, 63pm, no acute ST changes concerning for ischemia. Normal qtc. EKG interpretation by Sunil Mcclelland MD   [JM]      ED Course User Index  [FLORA] Sunil Mcclelland MD       FINAL IMPRESSION    Musculoskeletal pain    DISPOSITION/PLAN   DISPOSITION    Home    PATIENT REFERRED TO:  PCP    DISCHARGE MEDICATIONS:  Flexeril, Medrol Dosepak    4:33 PM  Pt has been reexamined. Pt has no new complaints, changes or physical findings. Care plan outlined and precautions discussed. All available results were reviewed with pt. All medications were reviewed with pt. All of pt's questions and concerns were addressed. Pt agrees to F/U as instructed and agrees to return to ED upon further deterioration. Pt is ready to go home. MIGUE Beebe NP  Please note that this dictation was completed with [employer], the computer voice recognition software. Quite often unanticipated grammatical, syntax, homophones, and other interpretive errors are inadvertently transcribed by the computer software. Please disregard these errors. Please excuse any errors that have escaped final proofreading. Thank you.       (Please note that portions of this note were completed with a voice recognition program.  Efforts were made to edit the dictations but Yes

## 2024-07-07 NOTE — DIETITIAN INITIAL EVALUATION ADULT. - PT NOT SOURCE
pt actively in the process of being discharged at time of visit, changing clothes at time of visit, wife and daughter provided information with pt's permission moderate assist (50% patients effort)/maximum assist (25% patients effort)

## 2025-06-09 NOTE — H&P ADULT - PROBLEM SELECTOR PLAN 4
No indicators present Per review of other chart, patient's Cr range from 1.6-1.9  No signs of MONISHA  Trend BMP  Renally-dose medications

## 2025-07-10 NOTE — DISCHARGE NOTE ADULT - NS AS DC PROVIDER CONTACT Y/N MULTI
Yes   Vital Signs Last 24 Hrs  T(C): 36.3 (14 Jul 2025 11:52), Max: 36.6 (13 Jul 2025 20:20)  T(F): 97.4 (14 Jul 2025 11:52), Max: 97.8 (13 Jul 2025 20:20)  HR: 63 (14 Jul 2025 11:52) (63 - 80)  BP: 111/65 (14 Jul 2025 11:52) (100/62 - 144/75)  BP(mean): --  RR: 18 (14 Jul 2025 11:52) (17 - 18)  SpO2: 94% (14 Jul 2025 11:52) (93% - 94%)    Parameters below as of 14 Jul 2025 11:52  Patient On (Oxygen Delivery Method): room air    GENERAL: NAD, lying in bed comfortably  HEAD:  Atraumatic, Normocephalic  EYES: EOMI, conjunctiva and sclera clear  ENT: Moist mucous membranes  NECK: Supple, No JVD  CHEST/LUNG: Clear to auscultation bilaterally; No rales, rhonchi, wheezing, or rubs. Unlabored respirations  HEART: Regular rate and rhythm; No murmurs, rubs, or gallops  ABDOMEN: Bowel sounds present; Soft, Nontender, Nondistended.   EXTREMITIES:  2+ Peripheral Pulses, brisk capillary refill. No clubbing, cyanosis, or edema  NERVOUS SYSTEM:  Alert & Oriented X3, speech clear. No deficits   MSK: FROM all 4 extremities, full and equal strength  SKIN: warm, dry